# Patient Record
Sex: FEMALE | Race: WHITE | Employment: OTHER | URBAN - METROPOLITAN AREA
[De-identification: names, ages, dates, MRNs, and addresses within clinical notes are randomized per-mention and may not be internally consistent; named-entity substitution may affect disease eponyms.]

---
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
## 2017-03-21 PROBLEM — S52.502A CLOSED FRACTURE OF DISTAL END OF LEFT RADIUS: Status: ACTIVE | Noted: 2017-03-21

## 2017-03-21 PROBLEM — S06.31AA: Status: ACTIVE | Noted: 2017-03-21

## 2018-09-24 ENCOUNTER — TELEPHONE (OUTPATIENT)
Dept: CARDIOLOGY CLINIC | Age: 73
End: 2018-09-24

## 2018-09-25 ENCOUNTER — OFFICE VISIT (OUTPATIENT)
Dept: CARDIOLOGY CLINIC | Age: 73
End: 2018-09-25
Payer: MEDICARE

## 2018-09-25 VITALS
DIASTOLIC BLOOD PRESSURE: 62 MMHG | RESPIRATION RATE: 16 BRPM | HEIGHT: 61 IN | SYSTOLIC BLOOD PRESSURE: 104 MMHG | BODY MASS INDEX: 34.08 KG/M2 | HEART RATE: 63 BPM | WEIGHT: 180.5 LBS

## 2018-09-25 DIAGNOSIS — Z01.810 PREOPERATIVE CARDIOVASCULAR EXAMINATION: ICD-10-CM

## 2018-09-25 DIAGNOSIS — I44.7 LEFT BUNDLE BRANCH BLOCK: ICD-10-CM

## 2018-09-25 DIAGNOSIS — I42.0 DILATED CARDIOMYOPATHY (HCC): ICD-10-CM

## 2018-09-25 DIAGNOSIS — I50.42 CHRONIC COMBINED SYSTOLIC AND DIASTOLIC CONGESTIVE HEART FAILURE (HCC): Primary | ICD-10-CM

## 2018-09-25 PROCEDURE — G8400 PT W/DXA NO RESULTS DOC: HCPCS | Performed by: INTERNAL MEDICINE

## 2018-09-25 PROCEDURE — 93000 ELECTROCARDIOGRAM COMPLETE: CPT | Performed by: INTERNAL MEDICINE

## 2018-09-25 PROCEDURE — 4040F PNEUMOC VAC/ADMIN/RCVD: CPT | Performed by: INTERNAL MEDICINE

## 2018-09-25 PROCEDURE — 1090F PRES/ABSN URINE INCON ASSESS: CPT | Performed by: INTERNAL MEDICINE

## 2018-09-25 PROCEDURE — G8427 DOCREV CUR MEDS BY ELIG CLIN: HCPCS | Performed by: INTERNAL MEDICINE

## 2018-09-25 PROCEDURE — 1036F TOBACCO NON-USER: CPT | Performed by: INTERNAL MEDICINE

## 2018-09-25 PROCEDURE — G8417 CALC BMI ABV UP PARAM F/U: HCPCS | Performed by: INTERNAL MEDICINE

## 2018-09-25 PROCEDURE — 1101F PT FALLS ASSESS-DOCD LE1/YR: CPT | Performed by: INTERNAL MEDICINE

## 2018-09-25 PROCEDURE — 1123F ACP DISCUSS/DSCN MKR DOCD: CPT | Performed by: INTERNAL MEDICINE

## 2018-09-25 PROCEDURE — 3017F COLORECTAL CA SCREEN DOC REV: CPT | Performed by: INTERNAL MEDICINE

## 2018-09-25 PROCEDURE — 99214 OFFICE O/P EST MOD 30 MIN: CPT | Performed by: INTERNAL MEDICINE

## 2018-09-25 NOTE — PROGRESS NOTES
[place] [facility]:  Maia Grey M.D., Fredrick RubiNovant Health Clemmons Medical Centersigifredo. Damari Rose M.D., Sunny Duff M.D. Celeste November Fleet   1945  Angelique Segura MD    Rigoberto Blanco was seen in our [facility] office today, 09/25/2018, for follow up of her dilated nonischemic cardiomyopathy and for preoperative assessment prior to right knee replacement. I saw her last in 02/2018. She subsequently had a left knee replacement without complications. She is scheduled now to have the right knee done. She does admit to some left foot and ankle swelling, but denies any swelling on the right. She does have a history of varicose veins, which were stripped in the past.  She does have mild chronic leg edema with dependent edema and possible lymphedema since then. She denies any chest pain or palpitations. She denies any orthopnea or PND. She denies any recent dramatic weight change. She did note that her creatinine was slightly increased lately and because of this, her Losartan was decreased to 50 mg daily and her torsemide was decreased to every other day. She seems to have tolerated the change well. Repeat laboratory studies are pending. Medical history includes:   1. Chronic LBBB present at least since 2003. 2. Abnormal MPS, 01/23/2003. EF 54%. Distal anteroseptal and apical ischemia. 3. Patient reportedly had catheterization at [facility] in 2003. Records are pending, but the patient was told that the catheterization was normal. 4. Echo, 11/04/2004. EF 45%. Mild PHTN. 5. Echo, 07/17/2006. EF 35-40%. Normal LV dimensions. Global hypokinesis with Stage I diastolic dysfunction. Moderate PHTN with a RVSP of 60 mmHg. 6. Varicose veins. Vein stripping approximately 1985.   7. Cigarette abuse, abstinent since 12/05/2006 on Chantix. 8. Chronic peripheral edema from varicose veins. 9. Hysterectomy. 10. Echo, 10/17/2006. LV size upper limits of normal. EF 40-45%.  Mild global hypokinesis, Stage II diastolic dysfunction, mild MR, mild TR RVSP upper limits of normal.   11. Episode of gout in the fall of 2006. 12. Diagnosis of obstructive sleep apnea, early 2007, treated with CPAP. 13. Echo, 05/07/2007. Normal LV size with mild global hypokinesis. EF 45-50%. Stage II diastolic dysfunction. Moderate LAE. Trace MR, mild TR. Mild PHTN. 14. Fluid retention and SOB noted 03/2007 with improvement after addition of oral Lasix. Electrolytes, BUN, Cr normal 04/25/2007 on 20 mg daily Lasix. 15. Allergy to penicillin. 12. Family history noncontributory. 17. OP CBC, 04/14/2008 with Hb 7. She received a transfusion. 18. Admission [facility], 04/17/2008 with acute appendicitis, WBC 15,000. 19. Laparoscopic appendectomy. Perforation and evidence of peritonitis. She required volume resuscitation and transient discontinuation of diuretics and Avapro. Cr mark to a peak of 2.9 but returned to normal.   20. Postop PE noted on CT angiogram, 04/2008 treated with heparin and then Coumadin. 21. Echo, 06/02/2008. Mild LV dilation. Normal systolic function. EF >55%. Normal diastolic function. Normal tissue Doppler and pulmonary venous flow patterns. Mild LAE. Mild MR, mild TR, mild PHTN.  22. Hospitalization, 08/2008 with atypical chest discomfort. No evidence for acute MI, stress test unremarkable. 23. Episode of syncope after laughing, 04/17/2009. 24. Echo, 04/28/2009 with mild LVE, diastolic dimension 6.3 cm. Borderline global hypokinesis, EF 46%. Diastolic function normal. Mild LAE. MAC with mild MR. Aortic sclerosis without stenosis. RVSP 39 mmHg. 25. 24 hour Holter monitor, 04/20/2009. Normal rate variation, LBBB. Occasional PAC's and PVC's, but no VT or pauses. 26. Echo, 07/08/2013. Mildly dilated LV with normal wall thickness, regional wall motion and systolic function. ~ EF >65%. Stage II diastolic dysfunction. Moderate ISRA. Mild MR and TR.  27. Severe anemia with Hgb 7.5 on 07/10/2013.  History of severe anemia with hemoglobin 7.5, 07/10/2013. Hemoglobin on 10/16/2017 was 11.4.    28. Echocardiogram, 10/16/2017, moderately dilated left ventricle with ejection fraction 55%. No regional wall motion abnormalities. Stage 1 diastolic relaxation abnormalities, trace aortic insufficiency without stenosis. Otherwise, unremarkable.      Review of Systems:  HEENT: negative for acute visual and auditory problems  Constitutional: negative for fever and chills  Respiratory: negative for cough and hemoptysis  Cardiovascular: Patient denies chest pain, palpitations, lightheadedness, syncope or any change in her chronic pedal edema. Gastrointestinal: patient does have an occasional ache in her right lower quadrant  Genitourinary: negative for dysuria and hematuria  Derm: negative for rash and skin lesion(s)  Neurological: negative for seizures and tremors  Endocrine: negative for diabetic symptoms including polydipsia and polyuria  Musculoskeletal: negative for CTD  Psychiatric: negative for anxiety and major depression.     The remainder of the review of systems is negative except as noted above.      On exam, she is an elderly white female who is awake, alert and oriented. P: 63 and regular. BP: 104/62. Wt. 180 lbs, which is 3 lbs. More than she weighed 7 months ago. BMI: 34.1. HEENT  Is normocephalic and atraumatic. Extraocular muscles are intact. Sclerae are clear. Pupils are equal, round and react to light. The oral mucosa is moist.  Tongue is midline. Her neck is supple. She has no jugular distention or hepatojugular reflux. Carotids are full without bruits. She has no neck or supraclavicular masses and no thyromegaly. Respirations are unlabored. Her chest is clear to auscultation and percussion. She has no wheezes or rales and no chest wall tenderness or presacral edema. Her heart has a regular rhythm with an S4 gallop, but no S3 or murmur.   The PMI is not displaced and there is no precordial heave, lift or

## 2018-09-25 NOTE — PATIENT INSTRUCTIONS
Take the spironolactone and torsemide on your usual schedule up till and including the day prior to surgery. They can be resumed the day after surgery has indicated. Take the carvedilol and losartan (Cozaar) up till and including the morning of surgery with a small sip of water. The rest of your medications can be withheld on the day of surgery until after surgery from my perspective.

## 2018-10-30 ENCOUNTER — HOSPITAL ENCOUNTER (OUTPATIENT)
Age: 73
Discharge: HOME | End: 2018-11-01

## 2018-10-30 LAB
ABO/RH: NORMAL
ANTIBODY SCREEN: NORMAL
BILIRUBIN URINE: NEGATIVE
BLOOD, URINE: NEGATIVE
CLARITY: CLEAR
COLOR: YELLOW
GLUCOSE URINE: NEGATIVE MG/DL
KETONES, URINE: NEGATIVE MG/DL
LEUKOCYTE ESTERASE, URINE: NEGATIVE
NITRITE, URINE: NEGATIVE
PH UA: 6 (ref 5–9)
PROTEIN UA: NEGATIVE MG/DL
SPECIFIC GRAVITY UA: 1.01 (ref 1–1.03)
UROBILINOGEN, URINE: 0.2 E.U./DL

## 2018-10-30 PROCEDURE — 86901 BLOOD TYPING SEROLOGIC RH(D): CPT

## 2018-10-30 PROCEDURE — 86900 BLOOD TYPING SEROLOGIC ABO: CPT

## 2018-10-30 PROCEDURE — 87081 CULTURE SCREEN ONLY: CPT

## 2018-10-30 PROCEDURE — 81003 URINALYSIS AUTO W/O SCOPE: CPT

## 2018-10-30 PROCEDURE — 87088 URINE BACTERIA CULTURE: CPT

## 2018-10-30 PROCEDURE — 86850 RBC ANTIBODY SCREEN: CPT

## 2018-11-01 LAB
MRSA CULTURE ONLY: NORMAL
URINE CULTURE, ROUTINE: NORMAL

## 2018-11-09 ENCOUNTER — HOSPITAL ENCOUNTER (OUTPATIENT)
Age: 73
Discharge: HOME | End: 2018-11-11

## 2018-11-09 LAB
ANION GAP SERPL CALCULATED.3IONS-SCNC: 12 MMOL/L (ref 7–16)
BUN BLDV-MCNC: 24 MG/DL (ref 8–23)
CALCIUM SERPL-MCNC: 7.8 MG/DL (ref 8.6–10.2)
CHLORIDE BLD-SCNC: 104 MMOL/L (ref 98–107)
CO2: 20 MMOL/L (ref 22–29)
CREAT SERPL-MCNC: 1.3 MG/DL (ref 0.5–1)
GFR AFRICAN AMERICAN: 49
GFR NON-AFRICAN AMERICAN: 40 ML/MIN/1.73
GLUCOSE BLD-MCNC: 211 MG/DL (ref 74–99)
HCT VFR BLD CALC: 29.2 % (ref 34–48)
HEMOGLOBIN: 9 G/DL (ref 11.5–15.5)
MCH RBC QN AUTO: 32 PG (ref 26–35)
MCHC RBC AUTO-ENTMCNC: 30.8 % (ref 32–34.5)
MCV RBC AUTO: 103.9 FL (ref 80–99.9)
PDW BLD-RTO: 14.2 FL (ref 11.5–15)
PLATELET # BLD: 198 E9/L (ref 130–450)
PMV BLD AUTO: 10 FL (ref 7–12)
POTASSIUM SERPL-SCNC: 4.8 MMOL/L (ref 3.5–5)
RBC # BLD: 2.81 E12/L (ref 3.5–5.5)
SODIUM BLD-SCNC: 136 MMOL/L (ref 132–146)
WBC # BLD: 8.8 E9/L (ref 4.5–11.5)

## 2018-11-09 PROCEDURE — 85027 COMPLETE CBC AUTOMATED: CPT

## 2018-11-09 PROCEDURE — 80048 BASIC METABOLIC PNL TOTAL CA: CPT

## 2018-11-10 ENCOUNTER — HOSPITAL ENCOUNTER (OUTPATIENT)
Age: 73
Discharge: HOME | End: 2018-11-12

## 2018-11-10 LAB
ANION GAP SERPL CALCULATED.3IONS-SCNC: 12 MMOL/L (ref 7–16)
BUN BLDV-MCNC: 29 MG/DL (ref 8–23)
CALCIUM SERPL-MCNC: 8.3 MG/DL (ref 8.6–10.2)
CHLORIDE BLD-SCNC: 105 MMOL/L (ref 98–107)
CO2: 23 MMOL/L (ref 22–29)
CREAT SERPL-MCNC: 1.5 MG/DL (ref 0.5–1)
GFR AFRICAN AMERICAN: 41
GFR NON-AFRICAN AMERICAN: 34 ML/MIN/1.73
GLUCOSE BLD-MCNC: 92 MG/DL (ref 74–99)
HCT VFR BLD CALC: 29.3 % (ref 34–48)
HEMOGLOBIN: 8.9 G/DL (ref 11.5–15.5)
MCH RBC QN AUTO: 31.8 PG (ref 26–35)
MCHC RBC AUTO-ENTMCNC: 30.4 % (ref 32–34.5)
MCV RBC AUTO: 104.6 FL (ref 80–99.9)
PDW BLD-RTO: 14.6 FL (ref 11.5–15)
PLATELET # BLD: 200 E9/L (ref 130–450)
PMV BLD AUTO: 10.1 FL (ref 7–12)
POTASSIUM SERPL-SCNC: 4.4 MMOL/L (ref 3.5–5)
RBC # BLD: 2.8 E12/L (ref 3.5–5.5)
SODIUM BLD-SCNC: 140 MMOL/L (ref 132–146)
WBC # BLD: 8.3 E9/L (ref 4.5–11.5)

## 2018-11-10 PROCEDURE — 85027 COMPLETE CBC AUTOMATED: CPT

## 2018-11-10 PROCEDURE — 80048 BASIC METABOLIC PNL TOTAL CA: CPT

## 2019-03-06 RX ORDER — TORSEMIDE 20 MG/1
TABLET ORAL
Qty: 45 TABLET | Refills: 3 | Status: SHIPPED | OUTPATIENT
Start: 2019-03-06

## 2019-04-23 ENCOUNTER — OFFICE VISIT (OUTPATIENT)
Dept: CARDIOLOGY CLINIC | Age: 74
End: 2019-04-23
Payer: MEDICARE

## 2019-04-23 VITALS — WEIGHT: 182.6 LBS | RESPIRATION RATE: 18 BRPM | HEIGHT: 61 IN | BODY MASS INDEX: 34.48 KG/M2

## 2019-04-23 DIAGNOSIS — I42.0 DILATED CARDIOMYOPATHY (HCC): ICD-10-CM

## 2019-04-23 DIAGNOSIS — I50.42 CHRONIC COMBINED SYSTOLIC AND DIASTOLIC CONGESTIVE HEART FAILURE (HCC): Primary | ICD-10-CM

## 2019-04-23 DIAGNOSIS — R06.09 DYSPNEA ON EXERTION: ICD-10-CM

## 2019-04-23 DIAGNOSIS — I50.22 CHRONIC SYSTOLIC CONGESTIVE HEART FAILURE (HCC): ICD-10-CM

## 2019-04-23 DIAGNOSIS — I44.7 LEFT BUNDLE BRANCH BLOCK: ICD-10-CM

## 2019-04-23 PROCEDURE — 93000 ELECTROCARDIOGRAM COMPLETE: CPT | Performed by: INTERNAL MEDICINE

## 2019-04-23 PROCEDURE — 3017F COLORECTAL CA SCREEN DOC REV: CPT | Performed by: INTERNAL MEDICINE

## 2019-04-23 PROCEDURE — 4040F PNEUMOC VAC/ADMIN/RCVD: CPT | Performed by: INTERNAL MEDICINE

## 2019-04-23 PROCEDURE — 1123F ACP DISCUSS/DSCN MKR DOCD: CPT | Performed by: INTERNAL MEDICINE

## 2019-04-23 PROCEDURE — 1090F PRES/ABSN URINE INCON ASSESS: CPT | Performed by: INTERNAL MEDICINE

## 2019-04-23 PROCEDURE — G8400 PT W/DXA NO RESULTS DOC: HCPCS | Performed by: INTERNAL MEDICINE

## 2019-04-23 PROCEDURE — 99213 OFFICE O/P EST LOW 20 MIN: CPT | Performed by: INTERNAL MEDICINE

## 2019-04-23 PROCEDURE — 1036F TOBACCO NON-USER: CPT | Performed by: INTERNAL MEDICINE

## 2019-04-23 PROCEDURE — G8417 CALC BMI ABV UP PARAM F/U: HCPCS | Performed by: INTERNAL MEDICINE

## 2019-04-23 PROCEDURE — G8427 DOCREV CUR MEDS BY ELIG CLIN: HCPCS | Performed by: INTERNAL MEDICINE

## 2019-04-23 NOTE — PROGRESS NOTES
Spouse name: Not on file    Number of children: Not on file    Years of education: Not on file    Highest education level: Not on file   Occupational History    Not on file   Social Needs    Financial resource strain: Not on file    Food insecurity:     Worry: Not on file     Inability: Not on file    Transportation needs:     Medical: Not on file     Non-medical: Not on file   Tobacco Use    Smoking status: Former Smoker     Packs/day: 1.00     Years: 40.00     Pack years: 40.00     Types: Cigarettes     Last attempt to quit: 12/1/2008     Years since quitting: 10.3    Smokeless tobacco: Never Used    Tobacco comment: quit 06   Substance and Sexual Activity    Alcohol use: Yes     Alcohol/week: 1.2 oz     Types: 2 Glasses of wine per week     Comment: occassional    Drug use: No    Sexual activity: Not on file   Lifestyle    Physical activity:     Days per week: Not on file     Minutes per session: Not on file    Stress: Not on file   Relationships    Social connections:     Talks on phone: Not on file     Gets together: Not on file     Attends [employer]oration[employer] service: Not on file     Active member of club or organization: Not on file     Attends meetings of clubs or organizations: Not on file     Relationship status: Not on file    Intimate partner violence:     Fear of current or ex partner: Not on file     Emotionally abused: Not on file     Physically abused: Not on file     Forced sexual activity: Not on file   Other Topics Concern    Not on file   Social History Narrative    Not on file       Allergies:   Allergies   Allergen Reactions    Mefoxin [Cefoxitin] Anaphylaxis    Pcn [Penicillins] Hives    Sulfa Antibiotics Rash       Current Medications:  Current Outpatient Medications   Medication Sig Dispense Refill    torsemide (DEMADEX) 20 MG tablet every other day now takes 20 mg 45 tablet 3    spironolactone (ALDACTONE) 25 MG tablet TAKE ONE TABLET BY MOUTH EVERY DAY 90 tablet 3    atorvastatin (LIPITOR) 20 MG tablet Take 20 mg by mouth daily      losartan (COZAAR) 100 MG tablet TAKE ONE TABLET BY MOUTH EVERY DAY (Patient taking differently: pt taking 50 mg daily now) 60 tablet 5    carvedilol (COREG) 12.5 MG tablet TAKE ONE TABLET BY MOUTH TWO TIMES A  tablet 3    acetaminophen (TYLENOL) 500 MG tablet Take 1,000 mg by mouth every 6 hours as needed for Pain      ferrous sulfate 325 (65 FE) MG tablet Take 325 mg by mouth every evening       allopurinol (ZYLOPRIM) 300 MG tablet Take 300 mg by mouth daily.  buPROPion (WELLBUTRIN XL) 300 MG XL tablet Take 300 mg by mouth every morning.  fluconazole (DIFLUCAN) 100 MG tablet TAKE 1 TABLET BY MOUTH 1 TIME PER DAY  0    Cholecalciferol (VITAMIN D3) 2000 UNITS CAPS Take 1 capsule by mouth every morning       No current facility-administered medications for this visit.         Physical Exam:  Resp 18   Ht 5' 1\" (1.549 m)   Wt 182 lb 9.6 oz (82.8 kg)   BMI 34.50 kg/m²   Wt Readings from Last 3 Encounters:   04/23/19 182 lb 9.6 oz (82.8 kg)   09/25/18 180 lb 8 oz (81.9 kg)   02/21/18 177 lb (80.3 kg)     Appearance: Awake, alert, no acute respiratory distress  Skin: Intact, no rash  Head: Normocephalic, atraumatic  Eyes: EOMI, no conjunctival erythema  ENMT: No pharyngeal erythema, MMM, no rhinorrhea  Neck: Supple, no carotid bruits  Lungs: Decreased BS B/L, no wheezing  Cardiac: Regular rate and rhythm, +S1S2, no murmurs apparent  Abdomen: Soft, nontender, +bowel sounds  Extremities: Moves all extremities x 4, 1+ lower extremity edema  Neurologic: No focal motor deficits apparent, normal mood and affect, alert and oriented x 3    Laboratory Tests:  Lab Results   Component Value Date    CREATININE 1.5 (H) 11/10/2018    BUN 29 (H) 11/10/2018     11/10/2018    K 4.4 11/10/2018     11/10/2018    CO2 23 11/10/2018     No results found for: MG  Lab Results   Component Value Date    WBC 8.3 11/10/2018    HGB 8.9 (L) 11/10/2018    HCT 29.3 (L) 11/10/2018    .6 (H) 11/10/2018     11/10/2018     Lab Results   Component Value Date    ALT 12 02/14/2018    AST 20 02/14/2018    ALKPHOS 73 02/14/2018    BILITOT 0.6 02/14/2018     Lab Results   Component Value Date    CHOL 173 02/14/2018    CHOL 276 (H) 02/17/2011     Lab Results   Component Value Date    TRIG 55 02/14/2018    TRIG 60 02/17/2011     Lab Results   Component Value Date     02/14/2018    .6 02/17/2011     Lab Results   Component Value Date    LDLCALC 60 02/14/2018    LDLCALC 163 (H) 02/17/2011     Lab Results   Component Value Date    LABVLDL 11 02/14/2018     No results found for: CHOLHDLRATIO  No results for input(s): PROBNP in the last 72 hours. Cardiac Tests:  ECG: SR, rate 61, LBBB    ASSESSMENT / PLAN:  1. Chronic LBBB present at least since 2003. 2. Abnormal MPS, 01/23/2003. EF 54%. Distal anteroseptal and apical ischemia. 3. Patient reportedly had catheterization at [facility] in 2003. Records are pending, but the patient was told that the catheterization was normal. 4. Echo, 11/04/2004. EF 45%. Mild PHTN. 5. Echo, 07/17/2006. EF 35-40%. Normal LV dimensions. Global hypokinesis with Stage I diastolic dysfunction. Moderate PHTN with a RVSP of 60 mmHg. 6. Cigarette abuse, abstinent since 12/2006  7. Chronic peripheral edema from varicose veins. 8. Echo, 10/17/2006. LV size upper limits of normal. EF 40-45%. Mild global hypokinesis, Stage II diastolic dysfunction, mild MR, mild TR RVSP upper limits of normal.   9. Diagnosis of obstructive sleep apnea, early 2007, treated with CPAP. 10. Echo, 05/07/2007. Normal LV size with mild global hypokinesis. EF 45-50%. Stage II diastolic dysfunction. Moderate LAE. Trace MR, mild TR. Mild PHTN.   11. OP CBC, 04/14/2008 with Hb 7. She received a transfusion. 12. Postop PE noted on CT angiogram, 04/2008 treated with heparin and then Coumadin. 13. Echo, 06/02/2008. Mild LV dilation.  Normal systolic function. EF >55%. Normal diastolic function. Normal tissue Doppler and pulmonary venous flow patterns. Mild LAE. Mild MR, mild TR, mild PHTN. 14. Hospitalization, 08/2008 with atypical chest discomfort. No evidence for acute MI, stress test unremarkable. 15. Episode of syncope after laughing, 04/17/2009. 16. Echo, 04/28/2009 with mild LVE, diastolic dimension 6.3 cm. Borderline global hypokinesis, EF 46%. Diastolic function normal. Mild LAE. MAC with mild MR. Aortic sclerosis without stenosis. RVSP 39 mmHg. 17. 24 hour Holter monitor, 04/20/2009. Normal rate variation, LBBB. Occasional PAC's and PVC's, but no VT or pauses. 18. Echo, 07/08/2013. Mildly dilated LV with normal wall thickness, regional wall motion and systolic function. ~ EF >65%. Stage II diastolic dysfunction. Moderate ISRA. Mild MR and TR.  19. Severe anemia with Hgb 7.5 on 07/10/2013. History of severe anemia with hemoglobin 7.5, 07/10/2013. Hemoglobin on 10/16/2017 was 11.4.    20. Echocardiogram, 10/16/2017, moderately dilated left ventricle with ejection fraction 55%.  No regional wall motion abnormalities.  Stage 1 diastolic relaxation abnormalities, trace aortic insufficiency without stenosis.  Otherwise, unremarkable. - Prior cardiac studies reviewed today  - Monitor renal function and electrolytes closely on diuretics and ARB  - Continue coreg      The patient's current medication list, allergies, problem list and results of all previously ordered testing were reviewed at today's visit.     Jai Arreaga MD  [facility]) Cardiology

## 2019-05-03 DIAGNOSIS — M81.0 SENILE OSTEOPOROSIS: ICD-10-CM

## 2019-05-03 RX ORDER — SODIUM CHLORIDE 9 MG/ML
INJECTION, SOLUTION INTRAVENOUS CONTINUOUS
Status: CANCELLED | OUTPATIENT
Start: 2019-05-03

## 2019-05-03 RX ORDER — METHYLPREDNISOLONE SODIUM SUCCINATE 125 MG/2ML
125 INJECTION, POWDER, LYOPHILIZED, FOR SOLUTION INTRAMUSCULAR; INTRAVENOUS ONCE
Status: CANCELLED | OUTPATIENT
Start: 2019-05-03

## 2019-05-03 RX ORDER — EPINEPHRINE 1 MG/ML
0.3 INJECTION, SOLUTION, CONCENTRATE INTRAVENOUS PRN
Status: CANCELLED | OUTPATIENT
Start: 2019-05-03

## 2019-05-03 RX ORDER — DIPHENHYDRAMINE HYDROCHLORIDE 50 MG/ML
50 INJECTION INTRAMUSCULAR; INTRAVENOUS ONCE
Status: CANCELLED | OUTPATIENT
Start: 2019-05-03

## 2019-05-03 RX ORDER — 0.9 % SODIUM CHLORIDE 0.9 %
10 VIAL (ML) INJECTION ONCE
Status: CANCELLED | OUTPATIENT
Start: 2019-05-03

## 2019-05-15 ENCOUNTER — HOSPITAL ENCOUNTER (OUTPATIENT)
Dept: INFUSION THERAPY | Age: 74
Setting detail: INFUSION SERIES
Discharge: HOME | End: 2019-05-15
Payer: MEDICARE

## 2019-05-15 VITALS
OXYGEN SATURATION: 97 % | BODY MASS INDEX: 34.36 KG/M2 | HEART RATE: 66 BPM | RESPIRATION RATE: 16 BRPM | TEMPERATURE: 98.7 F | HEIGHT: 61 IN | WEIGHT: 182 LBS | SYSTOLIC BLOOD PRESSURE: 112 MMHG | DIASTOLIC BLOOD PRESSURE: 64 MMHG

## 2019-05-15 DIAGNOSIS — M81.0 SENILE OSTEOPOROSIS: Primary | ICD-10-CM

## 2019-05-15 PROCEDURE — 6360000002 HC RX W HCPCS: Performed by: OBSTETRICS & GYNECOLOGY

## 2019-05-15 PROCEDURE — 96372 THER/PROPH/DIAG INJ SC/IM: CPT

## 2019-05-15 RX ORDER — SODIUM CHLORIDE 9 MG/ML
INJECTION, SOLUTION INTRAVENOUS CONTINUOUS
Status: CANCELLED | OUTPATIENT
Start: 2019-11-13

## 2019-05-15 RX ORDER — EPINEPHRINE 1 MG/ML
0.3 INJECTION, SOLUTION, CONCENTRATE INTRAVENOUS PRN
Status: CANCELLED | OUTPATIENT
Start: 2019-11-13

## 2019-05-15 RX ORDER — METHYLPREDNISOLONE SODIUM SUCCINATE 125 MG/2ML
125 INJECTION, POWDER, LYOPHILIZED, FOR SOLUTION INTRAMUSCULAR; INTRAVENOUS ONCE
Status: CANCELLED | OUTPATIENT
Start: 2019-11-13

## 2019-05-15 RX ORDER — 0.9 % SODIUM CHLORIDE 0.9 %
10 VIAL (ML) INJECTION ONCE
Status: CANCELLED | OUTPATIENT
Start: 2019-11-13

## 2019-05-15 RX ORDER — DIPHENHYDRAMINE HYDROCHLORIDE 50 MG/ML
50 INJECTION INTRAMUSCULAR; INTRAVENOUS ONCE
Status: CANCELLED | OUTPATIENT
Start: 2019-11-13

## 2019-05-15 RX ADMIN — DENOSUMAB 60 MG: 60 INJECTION SUBCUTANEOUS at 10:38

## 2019-05-15 NOTE — PROGRESS NOTES
Patient tolerated PROLIA  injection well. Therapy plan reviewed with patient. Verbalizes understanding. Reviewed AVS with patient, reviewed medication information, verbalizes good knowledge of current plan, and has no signs or symptoms to report at this time. Declines copy of AVS.  Next appointment made and patient instructed on lab draw and procedure for next injection.

## 2019-10-24 ENCOUNTER — HOSPITAL ENCOUNTER (EMERGENCY)
Age: 74
Discharge: HOME | End: 2019-10-24
Payer: MEDICARE

## 2019-10-24 VITALS
OXYGEN SATURATION: 94 % | WEIGHT: 180 LBS | HEIGHT: 61 IN | BODY MASS INDEX: 33.99 KG/M2 | DIASTOLIC BLOOD PRESSURE: 65 MMHG | SYSTOLIC BLOOD PRESSURE: 133 MMHG | TEMPERATURE: 98.2 F | RESPIRATION RATE: 14 BRPM | HEART RATE: 72 BPM

## 2019-10-24 DIAGNOSIS — S01.81XA FACIAL LACERATION, INITIAL ENCOUNTER: Primary | ICD-10-CM

## 2019-10-24 PROCEDURE — 90471 IMMUNIZATION ADMIN: CPT | Performed by: PHYSICIAN ASSISTANT

## 2019-10-24 PROCEDURE — 90715 TDAP VACCINE 7 YRS/> IM: CPT | Performed by: PHYSICIAN ASSISTANT

## 2019-10-24 PROCEDURE — 99282 EMERGENCY DEPT VISIT SF MDM: CPT

## 2019-10-24 PROCEDURE — 6360000002 HC RX W HCPCS: Performed by: PHYSICIAN ASSISTANT

## 2019-10-24 PROCEDURE — 12011 RPR F/E/E/N/L/M 2.5 CM/<: CPT

## 2019-10-24 RX ORDER — BACITRACIN ZINC AND POLYMYXIN B SULFATE 500; 1000 [USP'U]/G; [USP'U]/G
OINTMENT TOPICAL
Qty: 30 G | Refills: 0 | Status: SHIPPED | OUTPATIENT
Start: 2019-10-24 | End: 2019-10-31

## 2019-10-24 RX ADMIN — TETANUS TOXOID, REDUCED DIPHTHERIA TOXOID AND ACELLULAR PERTUSSIS VACCINE, ADSORBED 0.5 ML: 5; 2.5; 8; 8; 2.5 SUSPENSION INTRAMUSCULAR at 04:41

## 2019-10-24 ASSESSMENT — PAIN DESCRIPTION - PAIN TYPE: TYPE: ACUTE PAIN

## 2019-10-24 ASSESSMENT — PAIN SCALES - GENERAL: PAINLEVEL_OUTOF10: 3

## 2019-10-30 ENCOUNTER — OFFICE VISIT (OUTPATIENT)
Dept: SURGERY | Age: 74
End: 2019-10-30
Payer: MEDICARE

## 2019-10-30 VITALS
WEIGHT: 185 LBS | DIASTOLIC BLOOD PRESSURE: 80 MMHG | TEMPERATURE: 97.7 F | OXYGEN SATURATION: 97 % | RESPIRATION RATE: 20 BRPM | HEIGHT: 61 IN | SYSTOLIC BLOOD PRESSURE: 110 MMHG | HEART RATE: 66 BPM | BODY MASS INDEX: 34.93 KG/M2
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
## 2019-10-30 PROCEDURE — G8484 FLU IMMUNIZE NO ADMIN: HCPCS | Performed by: PHYSICIAN ASSISTANT

## 2019-10-30 PROCEDURE — 4040F PNEUMOC VAC/ADMIN/RCVD: CPT | Performed by: PHYSICIAN ASSISTANT

## 2019-10-30 PROCEDURE — 1090F PRES/ABSN URINE INCON ASSESS: CPT | Performed by: PHYSICIAN ASSISTANT

## 2019-10-30 PROCEDURE — 1036F TOBACCO NON-USER: CPT | Performed by: PHYSICIAN ASSISTANT

## 2019-10-30 PROCEDURE — G8427 DOCREV CUR MEDS BY ELIG CLIN: HCPCS | Performed by: PHYSICIAN ASSISTANT

## 2019-10-30 PROCEDURE — 99213 OFFICE O/P EST LOW 20 MIN: CPT | Performed by: PHYSICIAN ASSISTANT

## 2019-10-30 PROCEDURE — 3017F COLORECTAL CA SCREEN DOC REV: CPT | Performed by: PHYSICIAN ASSISTANT

## 2019-10-30 PROCEDURE — 1123F ACP DISCUSS/DSCN MKR DOCD: CPT | Performed by: PHYSICIAN ASSISTANT

## 2019-10-30 PROCEDURE — G8417 CALC BMI ABV UP PARAM F/U: HCPCS | Performed by: PHYSICIAN ASSISTANT

## 2019-10-30 PROCEDURE — G8400 PT W/DXA NO RESULTS DOC: HCPCS | Performed by: PHYSICIAN ASSISTANT

## 2019-11-13 ENCOUNTER — HOSPITAL ENCOUNTER (OUTPATIENT)
Dept: INFUSION THERAPY | Age: 74
Setting detail: INFUSION SERIES
Discharge: HOME | End: 2019-11-13
Payer: MEDICARE

## 2019-11-13 VITALS
HEART RATE: 76 BPM | SYSTOLIC BLOOD PRESSURE: 107 MMHG | WEIGHT: 185 LBS | HEIGHT: 61 IN | DIASTOLIC BLOOD PRESSURE: 58 MMHG | OXYGEN SATURATION: 97 % | TEMPERATURE: 97.6 F | BODY MASS INDEX: 34.93 KG/M2 | RESPIRATION RATE: 16 BRPM

## 2019-11-13 DIAGNOSIS — M81.0 SENILE OSTEOPOROSIS: Primary | ICD-10-CM

## 2019-11-13 PROCEDURE — 96372 THER/PROPH/DIAG INJ SC/IM: CPT

## 2019-11-13 PROCEDURE — 6360000002 HC RX W HCPCS: Performed by: OBSTETRICS & GYNECOLOGY

## 2019-11-13 RX ORDER — DIPHENHYDRAMINE HYDROCHLORIDE 50 MG/ML
50 INJECTION INTRAMUSCULAR; INTRAVENOUS ONCE
Status: CANCELLED | OUTPATIENT
Start: 2020-05-13

## 2019-11-13 RX ORDER — SODIUM CHLORIDE 9 MG/ML
INJECTION, SOLUTION INTRAVENOUS CONTINUOUS
Status: CANCELLED | OUTPATIENT
Start: 2020-05-13

## 2019-11-13 RX ORDER — 0.9 % SODIUM CHLORIDE 0.9 %
10 VIAL (ML) INJECTION ONCE
Status: CANCELLED | OUTPATIENT
Start: 2020-05-13

## 2019-11-13 RX ORDER — METHYLPREDNISOLONE SODIUM SUCCINATE 125 MG/2ML
125 INJECTION, POWDER, LYOPHILIZED, FOR SOLUTION INTRAMUSCULAR; INTRAVENOUS ONCE
Status: CANCELLED | OUTPATIENT
Start: 2020-05-13

## 2019-11-13 RX ORDER — EPINEPHRINE 1 MG/ML
0.3 INJECTION, SOLUTION, CONCENTRATE INTRAVENOUS PRN
Status: CANCELLED | OUTPATIENT
Start: 2020-05-13

## 2019-11-13 RX ADMIN — DENOSUMAB 60 MG: 60 INJECTION SUBCUTANEOUS at 10:45

## 2019-11-18 ENCOUNTER — HOSPITAL ENCOUNTER (OUTPATIENT)
Age: 74
Discharge: HOME | End: 2019-11-20
Payer: MEDICARE

## 2019-11-18 PROCEDURE — 87088 URINE BACTERIA CULTURE: CPT

## 2019-11-20 LAB — URINE CULTURE, ROUTINE: NORMAL

## 2020-01-09 RX ORDER — SPIRONOLACTONE 25 MG/1
TABLET ORAL
Qty: 90 TABLET | Refills: 3 | Status: SHIPPED | OUTPATIENT
Start: 2020-01-09

## 2020-02-17 ENCOUNTER — HOSPITAL ENCOUNTER (OUTPATIENT)
Age: 75
Discharge: HOME | End: 2020-02-19
Payer: MEDICARE

## 2020-02-17 PROCEDURE — 87077 CULTURE AEROBIC IDENTIFY: CPT

## 2020-02-17 PROCEDURE — 87186 SC STD MICRODIL/AGAR DIL: CPT

## 2020-02-17 PROCEDURE — 87088 URINE BACTERIA CULTURE: CPT

## 2020-02-19 LAB
ORGANISM: ABNORMAL
URINE CULTURE, ROUTINE: ABNORMAL

## 2020-05-13 ENCOUNTER — HOSPITAL ENCOUNTER (OUTPATIENT)
Dept: INFUSION THERAPY | Age: 75
Setting detail: INFUSION SERIES
Discharge: HOME | End: 2020-05-13
Payer: MEDICARE

## 2020-05-13 VITALS
HEART RATE: 69 BPM | WEIGHT: 190 LBS | TEMPERATURE: 97.9 F | SYSTOLIC BLOOD PRESSURE: 117 MMHG | BODY MASS INDEX: 35.87 KG/M2 | RESPIRATION RATE: 16 BRPM | HEIGHT: 61 IN | DIASTOLIC BLOOD PRESSURE: 70 MMHG | OXYGEN SATURATION: 99 %

## 2020-05-13 DIAGNOSIS — M81.0 SENILE OSTEOPOROSIS: Primary | ICD-10-CM

## 2020-05-13 PROCEDURE — 6360000002 HC RX W HCPCS: Performed by: OBSTETRICS & GYNECOLOGY

## 2020-05-13 PROCEDURE — 96372 THER/PROPH/DIAG INJ SC/IM: CPT

## 2020-05-13 RX ADMIN — DENOSUMAB 60 MG: 60 INJECTION SUBCUTANEOUS at 10:21

## 2020-06-22 ENCOUNTER — HOSPITAL ENCOUNTER (OUTPATIENT)
Dept: NON INVASIVE DIAGNOSTICS | Age: 75
Discharge: HOME | End: 2020-06-22
Payer: MEDICARE

## 2020-06-22 LAB
LV EF: 53 %
LVEF MODALITY: NORMAL

## 2020-06-22 PROCEDURE — 93306 TTE W/DOPPLER COMPLETE: CPT

## 2020-11-11 ENCOUNTER — HOSPITAL ENCOUNTER (OUTPATIENT)
Dept: INFUSION THERAPY | Age: 75
Setting detail: INFUSION SERIES
Discharge: HOME | End: 2020-11-11
Payer: MEDICARE

## 2020-11-11 VITALS
TEMPERATURE: 98 F | RESPIRATION RATE: 16 BRPM | OXYGEN SATURATION: 97 % | HEART RATE: 72 BPM | BODY MASS INDEX: 37.76 KG/M2 | SYSTOLIC BLOOD PRESSURE: 106 MMHG | DIASTOLIC BLOOD PRESSURE: 52 MMHG | WEIGHT: 200 LBS | HEIGHT: 61 IN

## 2020-11-11 DIAGNOSIS — M81.0 SENILE OSTEOPOROSIS: Primary | ICD-10-CM

## 2020-11-11 PROCEDURE — 96372 THER/PROPH/DIAG INJ SC/IM: CPT

## 2021-05-12 ENCOUNTER — HOSPITAL ENCOUNTER (OUTPATIENT)
Dept: INFUSION THERAPY | Age: 76
Setting detail: INFUSION SERIES
Discharge: HOME | End: 2021-05-12
Payer: MEDICARE

## 2021-05-12 VITALS
BODY MASS INDEX: 38.71 KG/M2 | TEMPERATURE: 97.8 F | SYSTOLIC BLOOD PRESSURE: 104 MMHG | HEART RATE: 78 BPM | OXYGEN SATURATION: 98 % | WEIGHT: 205 LBS | DIASTOLIC BLOOD PRESSURE: 55 MMHG | HEIGHT: 61 IN | RESPIRATION RATE: 16 BRPM

## 2021-05-12 DIAGNOSIS — M81.0 SENILE OSTEOPOROSIS: Primary | ICD-10-CM

## 2021-05-12 PROCEDURE — 6360000002 HC RX W HCPCS: Performed by: OBSTETRICS & GYNECOLOGY

## 2021-05-12 PROCEDURE — 96372 THER/PROPH/DIAG INJ SC/IM: CPT

## 2021-05-12 RX ADMIN — DENOSUMAB 60 MG: 60 INJECTION SUBCUTANEOUS at 10:12

## 2021-06-03 ENCOUNTER — OFFICE VISIT (OUTPATIENT)
Dept: CARDIOLOGY CLINIC | Age: 76
End: 2021-06-03
Payer: MEDICARE

## 2021-06-03 VITALS
SYSTOLIC BLOOD PRESSURE: 114 MMHG | HEART RATE: 72 BPM | WEIGHT: 206.4 LBS | BODY MASS INDEX: 38.97 KG/M2 | RESPIRATION RATE: 16 BRPM | HEIGHT: 61 IN | DIASTOLIC BLOOD PRESSURE: 62 MMHG

## 2021-06-03 DIAGNOSIS — G47.33 OSA (OBSTRUCTIVE SLEEP APNEA): ICD-10-CM

## 2021-06-03 DIAGNOSIS — I50.22 CHRONIC SYSTOLIC CONGESTIVE HEART FAILURE (HCC): Primary | ICD-10-CM

## 2021-06-03 DIAGNOSIS — I35.1 NONRHEUMATIC AORTIC VALVE INSUFFICIENCY: ICD-10-CM

## 2021-06-03 DIAGNOSIS — I44.7 LEFT BUNDLE BRANCH BLOCK: ICD-10-CM

## 2021-06-03 PROCEDURE — 4040F PNEUMOC VAC/ADMIN/RCVD: CPT | Performed by: INTERNAL MEDICINE

## 2021-06-03 PROCEDURE — 1090F PRES/ABSN URINE INCON ASSESS: CPT | Performed by: INTERNAL MEDICINE

## 2021-06-03 PROCEDURE — 1036F TOBACCO NON-USER: CPT | Performed by: INTERNAL MEDICINE

## 2021-06-03 PROCEDURE — 99214 OFFICE O/P EST MOD 30 MIN: CPT | Performed by: INTERNAL MEDICINE

## 2021-06-03 PROCEDURE — 93000 ELECTROCARDIOGRAM COMPLETE: CPT | Performed by: INTERNAL MEDICINE

## 2021-06-03 PROCEDURE — G8417 CALC BMI ABV UP PARAM F/U: HCPCS | Performed by: INTERNAL MEDICINE

## 2021-06-03 PROCEDURE — 3017F COLORECTAL CA SCREEN DOC REV: CPT | Performed by: INTERNAL MEDICINE

## 2021-06-03 PROCEDURE — G8400 PT W/DXA NO RESULTS DOC: HCPCS | Performed by: INTERNAL MEDICINE

## 2021-06-03 PROCEDURE — 1123F ACP DISCUSS/DSCN MKR DOCD: CPT | Performed by: INTERNAL MEDICINE

## 2021-06-03 PROCEDURE — G8427 DOCREV CUR MEDS BY ELIG CLIN: HCPCS | Performed by: INTERNAL MEDICINE

## 2021-06-03 RX ORDER — LOSARTAN POTASSIUM 25 MG/1
25 TABLET ORAL DAILY
COMMUNITY

## 2021-06-03 NOTE — PROGRESS NOTES
OUTPATIENT CARDIOLOGY FOLLOW-UP    Name: Estrella Cabrera    Age: 76 y.o. Primary Care Physician: Rojas Acosta MD    Date of Service: 6/3/2021    Chief Complaint: Follow-up for NICM, LBBB    Interim History:  Previously followed by Dr. Florence Benitez; she established care with me in 4/2019. No new cardiac complaints since last cardiology evaluation. She denies recent chest pain, palpitations, syncope, PND, or orthopnea. +chronic LÓPEZ (she uses O2 occasionally when she ambulates) / she follows with Dr. Severa Laurence. LE edema stable. SR with LBBB on EKG.     Review of Systems:   Cardiac: As per HPI  General: No fever, chills  Pulmonary: As per HPI  HEENT: No visual disturbances, difficult swallowing  GI: No nausea, vomiting  : No dysuria, hematuria  Endocrine: No thyroid disease or DM  Musculoskeletal: SAHA x 4, no focal motor deficits  Skin: Intact, no rashes  Neuro: No headache, seizures  Psych: Currently with no depression, anxiety    Past Medical History:  Past Medical History:   Diagnosis Date    Anemia     Arthritis     Blood circulation, collateral     legs    Broken arm 03/2017    Bruising tendency (HCC)     [facility]     COPD (chronic obstructive pulmonary disease) (HCC)     Depression     Dilated cardiomyopathy ([facility]     Disease of blood and blood forming organ     anemia    Gout     Head injury     History of blood transfusion     Hyperlipidemia     Hypertension     Knee problem July 4th 2014 injections both knees    LBBB (left bundle branch block)     PE (pulmonary embolism) 4/2008    Post OP    Sleep apnea     UTI (urinary tract infection) 11/2019    Varicose veins        Past Surgical History:  Past Surgical History:   Procedure Laterality Date    APPENDECTOMY      [facility]  2003    [facility]    COLONOSCOPY      HERNIA REPAIR      HYSTERECTOMY         Family History:  Family History   Problem Relation Age of Onset    Cancer Mother breast & me.to the brain    Other Father         aortic aney.  Arthritis Father     Heart Disease Father     Depression Father        Social History:  Social History     Socioeconomic History    Marital status:      Spouse name: Not on file    Number of children: Not on file    Years of education: Not on file    Highest education level: Not on file   Occupational History    Not on file   Tobacco Use    Smoking status: Former Smoker     Packs/day: 1.00     Years: 40.00     Pack years: 40.00     Types: Cigarettes     Quit date: 2008     Years since quittin.5    Smokeless tobacco: Never Used    Tobacco comment: quit 06   Vaping Use    Vaping Use: Never used   Substance and Sexual Activity    Alcohol use: Yes     Alcohol/week: 2.0 standard drinks     Types: 2 Glasses of wine per week     Comment: occassional    Drug use: No    Sexual activity: Not on file   Other Topics Concern    Not on file   Social History Narrative    Not on file     Social Determinants of Health     Financial Resource Strain:     Difficulty of Paying Living Expenses:    Food Insecurity:     Worried About Running Out of Food in the Last Year:     [address] in the Last Year:    Transportation Needs:     Lack of Transportation (Medical):  Lack of Transportation (Non-Medical):    Physical Activity:     Days of Exercise per Week:     Minutes of Exercise per Session:    Stress:     Feeling of Stress :    Social Connections:     Frequency of Communication with Friends and Family:     Frequency of Social Gatherings with Friends and Family:     Attends Rastafari Services:     Active Member of Clubs or Organizations:     Attends Club or Organization Meetings:     Marital Status:    Intimate Partner Violence:     Fear of Current or Ex-Partner:     Emotionally Abused:     Physically Abused:     Sexually Abused: Allergies:   Allergies   Allergen Reactions    Mefoxin [Cefoxitin] Anaphylaxis  Pcn [Penicillins] Hives    Sulfa Antibiotics Rash       Current Medications:  Current Outpatient Medications   Medication Sig Dispense Refill    losartan (COZAAR) 25 MG tablet Take 25 mg by mouth daily      Tiotropium Bromide-Olodaterol (STIOLTO RESPIMAT IN) Inhale 2 puffs into the lungs daily      spironolactone (ALDACTONE) 25 MG tablet TAKE ONE TABLET BY MOUTH EVERY DAY 90 tablet 3    torsemide (DEMADEX) 20 MG tablet every other day now takes 20 mg 45 tablet 3    atorvastatin (LIPITOR) 20 MG tablet Take 20 mg by mouth daily      carvedilol (COREG) 12.5 MG tablet TAKE ONE TABLET BY MOUTH TWO TIMES A  tablet 3    acetaminophen (TYLENOL) 500 MG tablet Take 1,000 mg by mouth every 6 hours as needed for Pain      Cholecalciferol (VITAMIN D3) 250 MCG (67737 UT) CAPS Take 1 capsule by mouth every morning       FERROUS SULFATE PO Take 5 mg by mouth every evening       allopurinol (ZYLOPRIM) 300 MG tablet Take 300 mg by mouth daily.  buPROPion (WELLBUTRIN XL) 300 MG XL tablet Take 300 mg by mouth every morning.  losartan (COZAAR) 100 MG tablet TAKE ONE TABLET BY MOUTH EVERY DAY (Patient not taking: Reported on 6/3/2021) 60 tablet 5     No current facility-administered medications for this visit.        Physical Exam:  /62   Pulse 72   Resp 16   Ht 5' 1\" (1.549 m)   Wt 206 lb 6.4 oz (93.6 kg)   BMI 39.00 kg/m²   Wt Readings from Last 3 Encounters:   06/03/21 206 lb 6.4 oz (93.6 kg)   05/12/21 205 lb (93 kg)   11/11/20 200 lb (90.7 kg)     Appearance: Awake, alert, no acute respiratory distress  Skin: Intact, no rash  Head: Normocephalic, atraumatic  Eyes: EOMI, no conjunctival erythema  ENMT: No pharyngeal erythema, MMM, no rhinorrhea  Neck: Supple, no carotid bruits  Lungs: Decreased BS B/L, no wheezing  Cardiac: Regular rate and rhythm, +S1S2, no murmurs apparent  Abdomen: Soft, nontender, +bowel sounds  Extremities: Moves all extremities x 4, 1+ lower extremity edema  Neurologic: No focal motor deficits apparent, normal mood and affect, alert and oriented x 3    Laboratory Tests:  Lab Results   Component Value Date    CREATININE 1.5 (H) 11/10/2018    BUN 29 (H) 11/10/2018     11/10/2018    K 4.4 11/10/2018     11/10/2018    CO2 23 11/10/2018     No results found for: MG  Lab Results   Component Value Date    WBC 8.3 11/10/2018    HGB 8.9 (L) 11/10/2018    HCT 29.3 (L) 11/10/2018    .6 (H) 11/10/2018     11/10/2018     Lab Results   Component Value Date    ALT 12 02/14/2018    AST 20 02/14/2018    ALKPHOS 73 02/14/2018    BILITOT 0.6 02/14/2018     Lab Results   Component Value Date    CHOL 173 02/14/2018    CHOL 276 (H) 02/17/2011     Lab Results   Component Value Date    TRIG 55 02/14/2018    TRIG 60 02/17/2011     Lab Results   Component Value Date     02/14/2018    .6 02/17/2011     Lab Results   Component Value Date    LDLCALC 60 02/14/2018    LDLCALC 163 (H) 02/17/2011     Lab Results   Component Value Date    LABVLDL 11 02/14/2018     No results found for: CHOLHDLRATIO  No results for input(s): PROBNP in the last 72 hours. Cardiac Tests:  ECG: SR, rate 72, LBBB    Echocardiogram: 6/22/2020 (Dr. Satya Trujillo)   Normal left ventricular size. Low normal LV systolic function. Ejection fraction is visually estimated at 50-55%. Indeterminate diastolic function. No regional wall motion abnormalities seen. Normal left ventricular wall thickness. Abnormal LV septal motion consistent with conduction disorder. Mildly enlarged right ventricle cavity. Right ventricle global systolic function is normal.   The left atrium is mild dilated. Mildly enlarged right atrium. Mild aortic valve regurgitation. RVSP is 28 mmHg. ASSESSMENT / PLAN:  1. Chronic LBBB present at least since 2003. 2. Abnormal MPS, 01/23/2003. EF 54%. Distal anteroseptal and apical ischemia. 3. Patient reportedly had catheterization at [facility] in 2003.  Records are pending, but the patient was told that the catheterization was normal. 4. Echo, 11/04/2004. EF 45%. Mild PHTN. 5. Echo, 07/17/2006. EF 35-40%. Normal LV dimensions. Global hypokinesis with Stage I diastolic dysfunction. Moderate PHTN with a RVSP of 60 mmHg. 6. Cigarette abuse, abstinent since 12/2006  7. Chronic peripheral edema from varicose veins. 8. Echo, 10/17/2006. LV size upper limits of normal. EF 40-45%. Mild global hypokinesis, Stage II diastolic dysfunction, mild MR, mild TR RVSP upper limits of normal.   9. Diagnosis of obstructive sleep apnea, early 2007, treated with CPAP. 10. Echo, 05/07/2007. Normal LV size with mild global hypokinesis. EF 45-50%. Stage II diastolic dysfunction. Moderate LAE. Trace MR, mild TR. Mild PHTN.   11. OP CBC, 04/14/2008 with Hb 7. She received a transfusion. 12. Postop PE noted on CT angiogram, 04/2008 treated with heparin and then Coumadin. 13. Echo, 06/02/2008. Mild LV dilation. Normal systolic function. EF >55%. Normal diastolic function. Normal tissue Doppler and pulmonary venous flow patterns. Mild LAE. Mild MR, mild TR, mild PHTN. 14. Hospitalization, 08/2008 with atypical chest discomfort. No evidence for acute MI, stress test unremarkable. 15. Episode of syncope after laughing, 04/17/2009. 16. Echo, 04/28/2009 with mild LVE, diastolic dimension 6.3 cm. Borderline global hypokinesis, EF 46%. Diastolic function normal. Mild LAE. MAC with mild MR. Aortic sclerosis without stenosis. RVSP 39 mmHg. 17. 24 hour Holter monitor, 04/20/2009. Normal rate variation, LBBB. Occasional PAC's and PVC's, but no VT or pauses. 18. Echo, 07/08/2013. Mildly dilated LV with normal wall thickness, regional wall motion and systolic function. ~ EF >65%. Stage II diastolic dysfunction. Moderate ISRA. Mild MR and TR.  19. Severe anemia with Hgb 7.5 on 07/10/2013. History of severe anemia with hemoglobin 7.5, 07/10/2013. Hemoglobin on 10/16/2017 was 11.4.    20.  Echocardiogram, 10/16/2017, moderately dilated left ventricle with ejection fraction 55%.  No regional wall motion abnormalities.  Stage 1 diastolic relaxation abnormalities, trace aortic insufficiency without stenosis.  Otherwise, unremarkable. - Results of 6/2020 echocardiogram reviewed with the patient today  - Monitor renal function and electrolytes closely on diuretics and ARB  - Continue coreg  - Treatment of PARK (compliant with BiPAP)  - Patient educated today re: LBBB and symptoms to monitor for  - Follow-up with Dr. Kasandra Bridges as scheduled)    Greater than 30 minutes was spent counseling the patient, reviewing the rationale for the above recommendations and reviewing the patient's current medication list, problem list and results of all previously ordered testing.       Hammad Arias MD  [facility]) Cardiology

## 2021-11-17 ENCOUNTER — HOSPITAL ENCOUNTER (OUTPATIENT)
Dept: INFUSION THERAPY | Age: 76
Setting detail: INFUSION SERIES
Discharge: HOME | End: 2021-11-17
Payer: MEDICARE

## 2021-11-17 VITALS
HEIGHT: 61 IN | WEIGHT: 200 LBS | HEART RATE: 72 BPM | RESPIRATION RATE: 18 BRPM | TEMPERATURE: 97.9 F | DIASTOLIC BLOOD PRESSURE: 71 MMHG | OXYGEN SATURATION: 97 % | SYSTOLIC BLOOD PRESSURE: 110 MMHG | BODY MASS INDEX: 37.76 KG/M2

## 2021-11-17 DIAGNOSIS — M81.0 SENILE OSTEOPOROSIS: Primary | ICD-10-CM

## 2021-11-17 PROCEDURE — 96372 THER/PROPH/DIAG INJ SC/IM: CPT

## 2021-11-17 PROCEDURE — 6360000002 HC RX W HCPCS: Performed by: OBSTETRICS & GYNECOLOGY

## 2021-11-17 RX ADMIN — DENOSUMAB 60 MG: 60 INJECTION SUBCUTANEOUS at 10:40

## 2021-11-17 NOTE — PROGRESS NOTES
Patient tolerated injection well. Therapy plan reviewed with patient. Verbalizes understanding. Reviewed AVS with patient, reviewed medication information, verbalizes good knowledge of current plan, and has no signs or symptoms to report at this time. Next appointment made and patient instructed on lab draw and procedure for next injection.

## 2022-05-17 ENCOUNTER — HOSPITAL ENCOUNTER (OUTPATIENT)
Dept: INFUSION THERAPY | Age: 77
Setting detail: INFUSION SERIES
Discharge: HOME | End: 2022-05-17
Payer: MEDICARE

## 2022-05-17 VITALS
OXYGEN SATURATION: 94 % | TEMPERATURE: 97.8 F | BODY MASS INDEX: 37.76 KG/M2 | SYSTOLIC BLOOD PRESSURE: 127 MMHG | HEART RATE: 77 BPM | WEIGHT: 200 LBS | RESPIRATION RATE: 18 BRPM | DIASTOLIC BLOOD PRESSURE: 63 MMHG | HEIGHT: 61 IN

## 2022-05-17 DIAGNOSIS — M81.0 SENILE OSTEOPOROSIS: Primary | ICD-10-CM

## 2022-05-17 PROCEDURE — 6360000002 HC RX W HCPCS: Performed by: OBSTETRICS & GYNECOLOGY

## 2022-05-17 PROCEDURE — 96372 THER/PROPH/DIAG INJ SC/IM: CPT

## 2022-05-17 RX ADMIN — DENOSUMAB 60 MG: 60 INJECTION SUBCUTANEOUS at 10:34

## 2022-05-17 NOTE — PROGRESS NOTES
Tolerated prolia injection well. Reviewed therapy plan, offered education material and/or discharge material, reviewed medication information and signs and symptoms  and educated on possible side effects, verbalizes good knowledge of current plan patient verbalizes understanding, and has no signs or symptoms to report at this time. Patient discharged. Patient alert and oriented x3. No distress noted. Vital signs stable. Patient denies any new or worsening pain. Patient denies any needs. All questions answered. Next appointment scheduled. Declines copy of AVS. Instructed on lab draw for next injection.

## 2022-11-17 ENCOUNTER — HOSPITAL ENCOUNTER (OUTPATIENT)
Dept: INFUSION THERAPY | Age: 77
Setting detail: INFUSION SERIES
Discharge: HOME | End: 2022-11-17
Payer: MEDICARE

## 2022-11-17 VITALS
OXYGEN SATURATION: 98 % | HEART RATE: 68 BPM | RESPIRATION RATE: 18 BRPM | DIASTOLIC BLOOD PRESSURE: 57 MMHG | BODY MASS INDEX: 37.57 KG/M2 | WEIGHT: 199 LBS | HEIGHT: 61 IN | TEMPERATURE: 96.4 F | SYSTOLIC BLOOD PRESSURE: 122 MMHG

## 2022-11-17 DIAGNOSIS — M81.0 SENILE OSTEOPOROSIS: Primary | ICD-10-CM

## 2022-11-17 PROCEDURE — 6360000002 HC RX W HCPCS: Performed by: OBSTETRICS & GYNECOLOGY

## 2022-11-17 PROCEDURE — 96372 THER/PROPH/DIAG INJ SC/IM: CPT

## 2022-11-17 RX ADMIN — DENOSUMAB 60 MG: 60 INJECTION SUBCUTANEOUS at 10:31

## 2022-11-17 NOTE — PROGRESS NOTES
Tolerated injection well. Reviewed therapy plan, offered education material and/or discharge material, reviewed medication information and signs and symptoms  and educated on possible side effects, verbalizes good knowledge of current plan patient verbalizes understanding, and has no signs or symptoms to report at this time. Patient discharged. Patient alert and oriented x3. No distress noted. Vital signs stable. Patient denies any new or worsening pain. Patient denies any needs. All questions answered. Next appointment scheduled. declinescopy of AVS. Instructed on lab draw for next injection.

## 2023-05-17 ENCOUNTER — HOSPITAL ENCOUNTER (OUTPATIENT)
Dept: INFUSION THERAPY | Age: 78
Setting detail: INFUSION SERIES
Discharge: HOME | End: 2023-05-17
Payer: MEDICARE

## 2023-05-17 VITALS
TEMPERATURE: 97.2 F | DIASTOLIC BLOOD PRESSURE: 73 MMHG | RESPIRATION RATE: 16 BRPM | BODY MASS INDEX: 36.28 KG/M2 | SYSTOLIC BLOOD PRESSURE: 125 MMHG | WEIGHT: 192 LBS | HEART RATE: 78 BPM

## 2023-05-17 DIAGNOSIS — M81.0 SENILE OSTEOPOROSIS: Primary | ICD-10-CM

## 2023-05-17 PROCEDURE — 6360000002 HC RX W HCPCS: Performed by: OBSTETRICS & GYNECOLOGY

## 2023-05-17 PROCEDURE — 96372 THER/PROPH/DIAG INJ SC/IM: CPT

## 2023-05-17 RX ADMIN — DENOSUMAB 60 MG: 60 INJECTION SUBCUTANEOUS at 10:18

## 2023-05-17 ASSESSMENT — PAIN DESCRIPTION - ORIENTATION: ORIENTATION: RIGHT

## 2023-05-17 ASSESSMENT — PAIN DESCRIPTION - DESCRIPTORS: DESCRIPTORS: ACHING;DISCOMFORT

## 2023-05-17 ASSESSMENT — PAIN SCALES - GENERAL: PAINLEVEL_OUTOF10: 4

## 2023-05-17 ASSESSMENT — PAIN DESCRIPTION - LOCATION: LOCATION: BACK

## 2023-05-17 NOTE — PROGRESS NOTES
Before  PROLIA  INJECTION patient declined any infections, open wounds, or change in medical condition. Tolerated infusion well. Reviewed therapy plan, offered education material and/or discharge material, reviewed medication information and signs and symptoms  and educated on possible side effects, verbalizes good knowledge of current plan patient verbalizes understanding, and has no signs or symptoms to report at this time. Patient discharged. Patient alert and oriented x3. No distress noted. Vital signs stable. Patient denies any new or worsening pain. Patient denies any needs. All questions answered.   Next appointment scheduleD

## 2023-11-29 ENCOUNTER — HOSPITAL ENCOUNTER (OUTPATIENT)
Dept: INFUSION THERAPY | Age: 78
Setting detail: INFUSION SERIES
Discharge: HOME | End: 2023-11-29
Payer: MEDICARE

## 2023-11-29 VITALS
OXYGEN SATURATION: 93 % | BODY MASS INDEX: 36.25 KG/M2 | WEIGHT: 192 LBS | RESPIRATION RATE: 16 BRPM | HEIGHT: 61 IN | TEMPERATURE: 97.4 F | DIASTOLIC BLOOD PRESSURE: 63 MMHG | SYSTOLIC BLOOD PRESSURE: 132 MMHG | HEART RATE: 69 BPM

## 2023-11-29 DIAGNOSIS — M81.0 SENILE OSTEOPOROSIS: Primary | ICD-10-CM

## 2023-11-29 PROCEDURE — 96372 THER/PROPH/DIAG INJ SC/IM: CPT

## 2023-11-29 PROCEDURE — 6360000002 HC RX W HCPCS: Performed by: OBSTETRICS & GYNECOLOGY

## 2023-11-29 RX ADMIN — DENOSUMAB 60 MG: 60 INJECTION SUBCUTANEOUS at 10:05

## 2024-03-07 ENCOUNTER — TELEPHONE (OUTPATIENT)
Dept: CARDIOLOGY CLINIC | Age: 79
End: 2024-03-07

## 2024-03-07 NOTE — TELEPHONE ENCOUNTER
Patient needs cardiac clearance for a CYSTOSCOPY RIGHT EXTERNAL URETERAL CATHETER INSERTION LAPAROSCOPIC ROBOTIC XI ASSISTED ABDOMINAL SACRAL COLPOPEXY WITH Y MESH, patient was last seen in 6/2021 and she denies any chest pain,SOB or palpitations and she is able to perform daily activities without any cardiac symptoms,please advise

## 2024-03-08 NOTE — TELEPHONE ENCOUNTER
Patient was  notified and note was faxed to [facility] urology at [phone].

## 2024-04-03 ENCOUNTER — HOSPITAL ENCOUNTER (OUTPATIENT)
Age: 79
Discharge: HOME | End: 2024-04-05
Payer: MEDICARE

## 2024-04-03 ENCOUNTER — HOSPITAL ENCOUNTER (OUTPATIENT)
Dept: GENERAL RADIOLOGY | Age: 79
Discharge: HOME | End: 2024-04-05
Payer: MEDICARE

## 2024-04-03 DIAGNOSIS — J44.9 CHRONIC OBSTRUCTIVE PULMONARY DISEASE, UNSPECIFIED COPD TYPE (HCC): ICD-10-CM

## 2024-04-03 PROCEDURE — 71046 X-RAY EXAM CHEST 2 VIEWS: CPT

## 2024-04-29 ENCOUNTER — TELEPHONE (OUTPATIENT)
Age: 79
End: 2024-04-29

## 2024-05-21 NOTE — PROGRESS NOTES
Faxed request for script and labs to Dr. Miranda's office called patent to remind her to get labs she said had already at [facility] called to her PCP Dr. Lopez's office and spoke with Trang she will fax results over.

## 2024-05-24 ENCOUNTER — HOSPITAL ENCOUNTER (OUTPATIENT)
Age: 79
Discharge: HOME | End: 2024-05-26

## 2024-05-28 NOTE — PROGRESS NOTES
Spoke to  Dr. Rodarte office about needing a prolia script for tomorrow. States patient has not been the office for over a year and they will not send a script at this time. Called patient who stated she will call and make an appointment with the office then will call us back to reschedule

## 2024-05-29 ENCOUNTER — HOSPITAL ENCOUNTER (OUTPATIENT)
Dept: INFUSION THERAPY | Age: 79
Setting detail: INFUSION SERIES
Discharge: HOME | End: 2024-05-29

## 2024-06-04 LAB — SURGICAL PATHOLOGY REPORT: NORMAL

## 2024-06-13 ENCOUNTER — APPOINTMENT (OUTPATIENT)
Dept: UROLOGY | Facility: CLINIC | Age: 79
End: 2024-06-13
Payer: MEDICARE

## 2024-06-13 VITALS — HEART RATE: 78 BPM | SYSTOLIC BLOOD PRESSURE: 114 MMHG | DIASTOLIC BLOOD PRESSURE: 69 MMHG | TEMPERATURE: 97.3 F

## 2024-06-13 DIAGNOSIS — N81.9 FEMALE GENITAL PROLAPSE, UNSPECIFIED TYPE: ICD-10-CM

## 2024-06-13 DIAGNOSIS — R32 URINARY INCONTINENCE, UNSPECIFIED TYPE: ICD-10-CM

## 2024-06-13 DIAGNOSIS — N81.10 FEMALE BLADDER PROLAPSE: ICD-10-CM

## 2024-06-13 PROCEDURE — 51798 US URINE CAPACITY MEASURE: CPT | Performed by: UROLOGY

## 2024-06-13 PROCEDURE — 1159F MED LIST DOCD IN RCRD: CPT | Performed by: UROLOGY

## 2024-06-13 PROCEDURE — 99204 OFFICE O/P NEW MOD 45 MIN: CPT | Performed by: UROLOGY

## 2024-06-13 RX ORDER — LOSARTAN POTASSIUM 25 MG/1
25 TABLET ORAL DAILY
COMMUNITY

## 2024-06-13 RX ORDER — SPIRONOLACTONE 25 MG/1
25 TABLET ORAL ONCE
COMMUNITY

## 2024-06-13 RX ORDER — ALLOPURINOL 300 MG/1
300 TABLET ORAL DAILY
COMMUNITY

## 2024-06-13 RX ORDER — ATORVASTATIN CALCIUM 40 MG/1
40 TABLET, FILM COATED ORAL DAILY
COMMUNITY
Start: 2024-04-02

## 2024-06-13 RX ORDER — CALCITRIOL 0.25 UG/1
0.25 CAPSULE ORAL DAILY
COMMUNITY
Start: 2024-05-06

## 2024-06-13 RX ORDER — DENOSUMAB 60 MG/ML
60 INJECTION SUBCUTANEOUS
COMMUNITY

## 2024-06-13 RX ORDER — CARVEDILOL 12.5 MG/1
25 TABLET ORAL
COMMUNITY

## 2024-06-13 RX ORDER — CEPHRADINE 500 MG
1 CAPSULE ORAL DAILY
COMMUNITY

## 2024-06-13 RX ORDER — BUPROPION HYDROCHLORIDE 300 MG/1
300 TABLET ORAL EVERY MORNING
COMMUNITY

## 2024-06-13 RX ORDER — TORSEMIDE 20 MG/1
20 TABLET ORAL DAILY
COMMUNITY

## 2024-06-13 ASSESSMENT — ENCOUNTER SYMPTOMS
OCCASIONAL FEELINGS OF UNSTEADINESS: 1
DEPRESSION: 1
LOSS OF SENSATION IN FEET: 0

## 2024-06-13 NOTE — PROGRESS NOTES
"Referred by:  Elvin Lopez MD         CHIEF COMPLAINT:  Pelvic Organ Prolapse, Stool coming out of Vagina, recurrent UTIs         HISTORY OF PRESENT ILLNESS:  This is a  78 y.o. female, No obstetric history on file. who presents with pelvic organ prolapse \"the size of a pear/water-balloon\"\", fecal matter exiting from the vagina, and recurrent UTIs. She currently has a UTI and has been taking Macrobid for the last 5 weeks.    Her pelvic organ prolapse began in 1990/1991 where she experienced uterine prolapse and underwent a vaginal hysterectomy in 1991. She has experienced prolapse since and tried a ring pessary 5-6 years back, and switched to a Gellhorn pessary 3 years ago. 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    She also experiences urinary urgency about \"a dozen times a day\" with movement/physical exertion, but does not pass urine everytime she feels urgency. She leaks in the morning on the way to the toilet occasionally. She occasionally leaks on stress (cough/laugh/sneeze), but it is not as noticeable as the urgency.    She experiences chafing and bruising of the prolapsed tissue and has not tried anything specific to alleviate the symptoms.      Past medical and surgical hx reviewed - pertinent for hysterectomy done in 1991 due to uterine prolapse, pitting edema in the legs for which she is being medically treated,         Specifically, she describes the following pelvic floor symptoms:          Prolapse: Yes       - Splinting: No              Incontinence:  Yes             Mixed              Urinary Symptoms: urgency       - Fluid intake: approx 20-32 ounces of water, 2 cups of coffee, and some sugar free iced tea          History: recurrent UTI, last 1 now and " none                  Bowel Symptoms: regular, easy to pass         OB/Gyn History:  - Menopausal: Yes           Postmenopausal bleeding: No  - HRT: No  - Pap up to date: Yes - Hysterectomy in 1991, no cervix   History of abnormal pap: No  - Sexually active:  No    - Number of prior vaginal deliveries: 3   Number of prior operative deliveries 0  - Number of prior c-sections: 0    - Mammogram up to date: Yes - Normal  - Colonoscopy up to date: Yes - Rectovaginal fistula, otherwise normal colonoscopy          PHYSICAL EXAMINATION:  No LMP recorded.  There is no height or weight on file to calculate BMI.  ,  Vitals:    06/13/24 1453   BP: 114/69   Pulse: 78   Temp: 36.3 °C (97.3 °F)       General Appearance: well appearing  Neuro: Alert and oriented     Pelvic:  Genitourinary: Fecal matter present on the genital hiatus and labia; normal external genitalia, Bartholin's glands negative, Whitten's glands negative    Urethra: normal meatus, non-tender, no periurethral mass    Vaginal mucosa shows ulceration and a large amount of granulation tissue on left vaginal wall 5 cm proximal to the hymen. There is ulceration and scarring present at the apex of the vagina. Upon valsalva maneuver, fecal matter enters into the vagina and onto speculum.    Cervix surgically absent    Uterus surgically absent    Adnexae: negative nontender, no masses    Atrophy positive    CST positive      POP-Q (in supine position):       Aa +3     Ba +3     C 0              gh 3.5     pb 4     tvl 7              Ap -1     Bp -1     D NA (Hysterectomy)    Rectal: visibly concentric squeeze; no hemorrhoids, fissures or masses; able to feel where the fistula is by palpating deeper in the rectum    PVR (by Ultrasound): 216   Urine dip: No results found for this or any previous visit (from the past 24 hour(s)).      IMPRESSION AND PLAN:  Vilma Sagastume is a 78 y.o. who presents with pelvic organ prolapse posthysterectomy, urinary urgency, recurrent UTIs and  a rectovaginal fistula.     The plan by Dr. Rivers is to discuss this case with Dr. Wright who the patient is scheduled to have an appointment with on July 9, 2024, to discuss surgical treatment options.     The patient is to get a urodynamic test done and follow up after results are available via a virtual appointment.    Urine culture will be conducted since patient was unable to urinate initially during appointment and unable to give samples. If sample comes back with a positive culture, she will be switched to Ciprofloxacin as per Dr. Rivers.    Follow up after UDS testing done.    Patient seen and discussed with Dr. Rivers. All questions and concerns were answered and addressed.  The patient expressed understanding and agrees with the plan.     PRATEEK BLACKMONDEEJAY  [unfilled]    6/13/2024    I personally verified?and edited?the documentation of the medical student including the key elements of the history and confirm its accuracy. I personally performed the physical examination and medical decision making as documented in the medical student's note and confirm its accuracy. The medical decision making for this service was based on my clinical judgement.     Bethel Rivers MD   [phone]

## 2024-06-14 ENCOUNTER — LAB (OUTPATIENT)
Dept: LAB | Facility: LAB | Age: 79
End: 2024-06-14
Payer: MEDICARE

## 2024-06-14 DIAGNOSIS — R32 URINARY INCONTINENCE, UNSPECIFIED TYPE: ICD-10-CM

## 2024-06-14 PROCEDURE — 87086 URINE CULTURE/COLONY COUNT: CPT

## 2024-06-15 LAB — BACTERIA UR CULT: NO GROWTH

## 2024-06-18 ENCOUNTER — TELEPHONE (OUTPATIENT)
Dept: UROLOGY | Facility: CLINIC | Age: 79
End: 2024-06-18
Payer: MEDICARE

## 2024-06-20 ENCOUNTER — APPOINTMENT (OUTPATIENT)
Dept: UROLOGY | Facility: CLINIC | Age: 79
End: 2024-06-20
Payer: MEDICARE

## 2024-06-25 ENCOUNTER — APPOINTMENT (OUTPATIENT)
Dept: UROLOGY | Facility: CLINIC | Age: 79
End: 2024-06-25
Payer: MEDICARE

## 2024-06-25 DIAGNOSIS — R32 URINARY INCONTINENCE, UNSPECIFIED TYPE: ICD-10-CM

## 2024-06-25 PROCEDURE — 51728 CYSTOMETROGRAM W/VP: CPT | Performed by: NURSE PRACTITIONER

## 2024-06-25 PROCEDURE — 51797 INTRAABDOMINAL PRESSURE TEST: CPT | Performed by: NURSE PRACTITIONER

## 2024-06-25 PROCEDURE — 51784 ANAL/URINARY MUSCLE STUDY: CPT | Performed by: NURSE PRACTITIONER

## 2024-06-25 PROCEDURE — 51741 ELECTRO-UROFLOWMETRY FIRST: CPT | Performed by: NURSE PRACTITIONER

## 2024-06-25 RX ORDER — DEXTROMETHORPHAN HYDROBROMIDE, GUAIFENESIN 5; 100 MG/5ML; MG/5ML
650 LIQUID ORAL EVERY 8 HOURS PRN
COMMUNITY

## 2024-06-25 RX ORDER — PHENAZOPYRIDINE HYDROCHLORIDE 95 MG/1
95 TABLET ORAL 3 TIMES DAILY PRN
COMMUNITY

## 2024-06-25 NOTE — PROGRESS NOTES
Vilma Sagastume 78 y.o. female  Procedures:  Patient referred by Dr. Rivers for urodynamics to evaluate mixed incontinence, female organ prolapse, incomplete bladder emptying and uti. Krista Sylvester CNP was present in office at time of study. Pre-uroflow was completed with a pvr of 350 ml. Urine was clear for uti today. Patient did leak on CLPP/VLPP w/swab reduced. Pvr after study was 388 ml.  Patient instructed to increase fluids if she has any burning or blood in urine. Patient made aware she may have blood vaginally/rectally due to abdominal catheter insertion.  Patient understood and consented to urodynamics. Patient will follow up with Dr. Rivers to review results. 06/25/2024/fredi

## 2024-06-25 NOTE — PROGRESS NOTES
CHIEF COMPLAINT:  ***  An interactive audio and video telecommunication system which permits real time communications between the patient (at the originating site) and provider (at the distant site) was utilized to provide this telehealth service.    Verbal consent was requested and obtained fromNAME@ on this date,DATE@ , for a telehealth visit.     A telephone visit (audio only) between the patient (at the originating site) and the provider (at the distant site) was utilized to provide this telehealth service.    Verbal consent was requested and obtained fromNAME@ on this date, [unfilled] , for a telehealth visit.     HISTORY OF PRESENT ILLNESS:    This is a  78 y.o. female who presents for follow-up for UDS review.  On uroflow, the patient voided a total of 22 cc leaving a residual of 309 cc in her bladder.  On CMG she had the first desire to void at 120 cc, strong desire at 348 cc and capacity of 453 ml. patient leaked at bladder volume of 150 cc with Valsalva and a leak point pressure of 77 cm of water. there was no evidence of detrusor overactivity .on pressure flow study, patient voided only 65 cc with peak flow of 7 cc/s leaving a residual of 388 cc in her bladder.  Note that the pressure flow study was done with reduction of prolapse.    This urodynamic shows evidence of stress urinary incontinence with reduction of prolapse and evidence of persistent incomplete bladder emptying despite reduction of prolapse.        Extract from my last note 6/13/24  Date  The plan by Dr. Rivers is to discuss this case with Dr. Wright who the patient is scheduled to have an appointment with on July 9, 2024, to discuss surgical treatment options.      The patient is to get a urodynamic test done and follow up after results are available via a virtual appointment.     Urine culture will be conducted since patient was unable to urinate initially during appointment and unable to give samples. If sample comes back with a positive  culture, she will be switched to Ciprofloxacin as per Dr. Rivers.     Follow up after UDS testing done.      Review of Systems    IMPRESSION AND PLAN     Vilma Sagastume is a 78 y.o. who presents with pelvic organ prolapse posthysterectomy, urinary urgency, recurrent UTIs and a rectovaginal fistula.      Vilma Sagastume is a 78 y.o. who presents with ***      All questions and concerns were answered and addressed.  The patient expressed understanding and agrees with the plan.       SIGNATURES  Bethel Rivers MD  [phone]

## 2024-06-26 ENCOUNTER — TELEMEDICINE (OUTPATIENT)
Dept: UROLOGY | Facility: CLINIC | Age: 79
End: 2024-06-26
Payer: MEDICARE

## 2024-06-26 DIAGNOSIS — N81.10 FEMALE BLADDER PROLAPSE: Primary | ICD-10-CM

## 2024-06-26 DIAGNOSIS — R32 URINARY INCONTINENCE, UNSPECIFIED TYPE: ICD-10-CM

## 2024-06-26 PROCEDURE — 51741 ELECTRO-UROFLOWMETRY FIRST: CPT | Performed by: UROLOGY

## 2024-06-26 PROCEDURE — 51729 CYSTOMETROGRAM W/VP&UP: CPT | Performed by: UROLOGY

## 2024-06-26 PROCEDURE — 51797 INTRAABDOMINAL PRESSURE TEST: CPT | Performed by: UROLOGY

## 2024-06-26 PROCEDURE — 99214 OFFICE O/P EST MOD 30 MIN: CPT | Performed by: UROLOGY

## 2024-06-26 PROCEDURE — 51784 ANAL/URINARY MUSCLE STUDY: CPT | Performed by: UROLOGY

## 2024-07-02 ENCOUNTER — OFFICE VISIT (OUTPATIENT)
Dept: CARDIOLOGY CLINIC | Age: 79
End: 2024-07-02
Payer: MEDICARE

## 2024-07-02 VITALS
RESPIRATION RATE: 18 BRPM | HEIGHT: 61 IN | BODY MASS INDEX: 34.44 KG/M2 | WEIGHT: 182.4 LBS | DIASTOLIC BLOOD PRESSURE: 62 MMHG | SYSTOLIC BLOOD PRESSURE: 118 MMHG | HEART RATE: 57 BPM

## 2024-07-02 DIAGNOSIS — I44.7 LEFT BUNDLE BRANCH BLOCK: ICD-10-CM

## 2024-07-02 DIAGNOSIS — I42.0 DILATED CARDIOMYOPATHY (HCC): ICD-10-CM

## 2024-07-02 DIAGNOSIS — I50.22 CHRONIC SYSTOLIC CONGESTIVE HEART FAILURE (HCC): Primary | Chronic | ICD-10-CM

## 2024-07-02 DIAGNOSIS — G47.33 OSA (OBSTRUCTIVE SLEEP APNEA): ICD-10-CM

## 2024-07-02 PROCEDURE — 1090F PRES/ABSN URINE INCON ASSESS: CPT | Performed by: INTERNAL MEDICINE

## 2024-07-02 PROCEDURE — G8417 CALC BMI ABV UP PARAM F/U: HCPCS | Performed by: INTERNAL MEDICINE

## 2024-07-02 PROCEDURE — G8400 PT W/DXA NO RESULTS DOC: HCPCS | Performed by: INTERNAL MEDICINE

## 2024-07-02 PROCEDURE — 99214 OFFICE O/P EST MOD 30 MIN: CPT | Performed by: INTERNAL MEDICINE

## 2024-07-02 PROCEDURE — 93000 ELECTROCARDIOGRAM COMPLETE: CPT | Performed by: INTERNAL MEDICINE

## 2024-07-02 PROCEDURE — G8427 DOCREV CUR MEDS BY ELIG CLIN: HCPCS | Performed by: INTERNAL MEDICINE

## 2024-07-02 PROCEDURE — 1123F ACP DISCUSS/DSCN MKR DOCD: CPT | Performed by: INTERNAL MEDICINE

## 2024-07-02 PROCEDURE — 1036F TOBACCO NON-USER: CPT | Performed by: INTERNAL MEDICINE

## 2024-07-02 RX ORDER — CALCITRIOL 0.25 UG/1
0.25 CAPSULE, LIQUID FILLED ORAL DAILY
COMMUNITY
Start: 2024-05-06

## 2024-07-02 RX ORDER — IBUPROFEN 200 MG
200 TABLET ORAL EVERY 6 HOURS PRN
COMMUNITY

## 2024-07-02 RX ORDER — NITROFURANTOIN 25; 75 MG/1; MG/1
100 CAPSULE ORAL 2 TIMES DAILY
COMMUNITY
Start: 2024-06-15

## 2024-07-02 RX ORDER — PHENAZOPYRIDINE HYDROCHLORIDE 95 MG/1
95 TABLET ORAL 3 TIMES DAILY PRN
COMMUNITY

## 2024-07-02 NOTE — PROGRESS NOTES
trace aortic insufficiency without stenosis.  Otherwise, unremarkable.     - Results of 6/2020 echocardiogram reviewed with the patient today --> serial echocardiograms  - Monitor renal function and electrolytes closely on diuretics and ARB  - Continue coreg  - Treatment of PARK (compliant with BiPAP)  - Patient educated again today re: LBBB and symptoms to monitor for  - Follow-up with Dr. Gage as scheduled    Greater than 30 minutes was spent counseling the patient, reviewing the rationale for the above recommendations and reviewing the patient's current medication list, problem list and results of all previously ordered testing.      Stevo Delgado MD  [facility] Cardiology

## 2024-07-02 NOTE — PROGRESS NOTES
HISTORY OF PRESENTING ILLNESS: Vilma Sagastume is a 78yoF with a colovaginal fistula.  She was referred by Dr. Rivers.      She admits that over the last couple months she notes that she has had stool per vagina.  She denies any abdominal pain.  Details of her current condition can be seen in Dr. Rivers's note dated 6/13/2024.    Colonoscopy, 5/24/24 with Dr. Ethan Simmons: Digital rectal exam was conducted.  A rectal defect was palpated.  Colonoscope was advanced with minimal pressure by nursing staff.  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  I cannulated through the rectosigmoid area without difficulty.  I continued to pass the scope through the entire colon into the cecum.  Sigmoid diverticulosis was identified.  Ileocecal valve, appendiceal orifice was visualized.  Convergence of the taenia was visualized.  1 distal rectal polyp was identified and removed using cold snare polypectomy.  Hemostasis was excellent.  I slowly withdrew the scope back, encountering normal mucosa throughout except for above findings.  Good bowel prep was encountered.  Upon retroflexion in the rectum, no significant hemorrhoids were present.  Otherwise normal colonoscopy was observed.      Pathology: Distal rectal polyp, polypectomy: Hyperplastic polyp         Past Medical History:   Diagnosis Date    Prolapse of female pelvic organs          Past Surgical History:   Procedure Laterality Date    HYSTERECTOMY           Social History     Tobacco Use    Smoking status: Never    Smokeless tobacco: Never   Substance Use Topics    Alcohol use: Yes    Drug use: Never         No family history on file.        Current Outpatient Medications:     acetaminophen (Tylenol 8 HOUR) 650 mg ER tablet, Take 1 tablet (650 mg) by mouth every 8 hours if needed for mild pain (1 - 3). Do not crush, chew, or split., Disp: , Rfl:     allopurinol (Zyloprim) 300 mg tablet, Take 1 tablet (300 mg) by  mouth once daily., Disp: , Rfl:     atorvastatin (Lipitor) 40 mg tablet, Take 1 tablet (40 mg) by mouth once daily., Disp: , Rfl:     buPROPion XL (Wellbutrin XL) 300 mg 24 hr tablet, Take 1 tablet (300 mg) by mouth once daily in the morning., Disp: , Rfl:     calcitriol (Rocaltrol) 0.25 mcg capsule, Take 1 capsule (0.25 mcg) by mouth once daily., Disp: , Rfl:     carvedilol (Coreg) 12.5 mg tablet, Take 2 tablets (25 mg) by mouth., Disp: , Rfl:     cholecalciferol, vitamin D3, 250 mcg (10,000 unit) capsule, Take 1 capsule (250 mcg) by mouth once daily., Disp: , Rfl:     denosumab (Prolia) 60 mg/mL syringe, Inject 1 mL (60 mg) under the skin., Disp: , Rfl:     losartan (Cozaar) 25 mg tablet, Take 1 tablet (25 mg) by mouth once daily., Disp: , Rfl:     phenazopyridine (Urinary Pain Relief) 95 mg tablet, Take 1 tablet (95 mg) by mouth 3 times a day as needed for bladder spasms., Disp: , Rfl:     spironolactone (Aldactone) 25 mg tablet, Take 1 tablet (25 mg) by mouth 1 time., Disp: , Rfl:     torsemide (Demadex) 20 mg tablet, Take 1 tablet (20 mg) by mouth once daily., Disp: , Rfl:        Allergies   Allergen Reactions    Cefoxitin Anaphylaxis    Nickel Rash    Penicillins Hives    Sulfa (Sulfonamide Antibiotics) Rash         REVIEW OF SYSTEMS: As stated in the HPI above. All other systems negative.    Review of Systems    Labs:       Imaging:      Physical Exam:  Physical Exam  Constitutional:       Appearance: Normal appearance.   HENT:      Head: Normocephalic and atraumatic.   Eyes:      Extraocular Movements: Extraocular movements intact.   Cardiovascular:      Rate and Rhythm: Normal rate.   Pulmonary:      Effort: Pulmonary effort is normal.   Abdominal:      General: Abdomen is flat.   Skin:     General: Skin is warm and dry.   Neurological:      General: No focal deficit present.      Mental Status: She is alert and oriented to person, place, and time.   Psychiatric:         Mood and Affect: Mood normal.        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  After reviewing the chart I we will need to perform an exam under anesthesia to get a better sense of the exact location of the presence of this fistula.  Once this has been completed I will discuss my findings with Dr. Mckeon to coordinate a combined procedure to help this patient.       MD Angelika Leggett RN   7/2/2024

## 2024-07-09 ENCOUNTER — PREP FOR PROCEDURE (OUTPATIENT)
Dept: SURGERY | Facility: CLINIC | Age: 79
End: 2024-07-09
Payer: MEDICARE

## 2024-07-09 ENCOUNTER — OFFICE VISIT (OUTPATIENT)
Dept: SURGERY | Facility: CLINIC | Age: 79
End: 2024-07-09
Payer: MEDICARE

## 2024-07-09 VITALS
SYSTOLIC BLOOD PRESSURE: 111 MMHG | DIASTOLIC BLOOD PRESSURE: 74 MMHG | WEIGHT: 177 LBS | TEMPERATURE: 98.1 F | HEIGHT: 61 IN | BODY MASS INDEX: 33.42 KG/M2 | HEART RATE: 75 BPM

## 2024-07-09 DIAGNOSIS — N82.4 COLOVAGINAL FISTULA: Primary | ICD-10-CM

## 2024-07-09 PROCEDURE — 1159F MED LIST DOCD IN RCRD: CPT | Performed by: COLON & RECTAL SURGERY

## 2024-07-09 PROCEDURE — 99215 OFFICE O/P EST HI 40 MIN: CPT | Performed by: COLON & RECTAL SURGERY

## 2024-07-09 PROCEDURE — 99205 OFFICE O/P NEW HI 60 MIN: CPT | Performed by: COLON & RECTAL SURGERY

## 2024-07-09 RX ORDER — LOPERAMIDE HCL 2 MG
2 TABLET ORAL 4 TIMES DAILY PRN
COMMUNITY

## 2024-07-09 RX ORDER — TIOTROPIUM BROMIDE AND OLODATEROL 3.124; 2.736 UG/1; UG/1
2 SPRAY, METERED RESPIRATORY (INHALATION) DAILY
COMMUNITY

## 2024-07-09 RX ORDER — SODIUM CHLORIDE, SODIUM LACTATE, POTASSIUM CHLORIDE, CALCIUM CHLORIDE 600; 310; 30; 20 MG/100ML; MG/100ML; MG/100ML; MG/100ML
10 INJECTION, SOLUTION INTRAVENOUS CONTINUOUS
OUTPATIENT
Start: 2024-07-09

## 2024-07-09 NOTE — PATIENT INSTRUCTIONS

## 2024-08-08 ENCOUNTER — APPOINTMENT (OUTPATIENT)
Dept: UROLOGY | Facility: CLINIC | Age: 79
End: 2024-08-08
Payer: MEDICARE

## 2024-08-08 DIAGNOSIS — R30.0 DYSURIA: ICD-10-CM

## 2024-08-08 DIAGNOSIS — N95.2 ATROPHIC VAGINITIS: ICD-10-CM

## 2024-08-08 RX ORDER — ESTRADIOL 0.1 MG/G
CREAM VAGINAL
Qty: 42.5 G | Refills: 5 | Status: SHIPPED | OUTPATIENT
Start: 2024-08-08 | End: 2025-08-08

## 2024-08-08 NOTE — PROGRESS NOTES
Patient called with concerns of burning with urination, cloudy urine. She states she has vaginal dryness and chaffing. She discussed a cream with Dr. Rivers's medical student previously and was interested in trying cream for her irritation. Urine culture ordered to rule out UTI for dysuria symptoms. Estrogen cream pending providers approval.

## 2024-08-09 ENCOUNTER — LAB (OUTPATIENT)
Dept: LAB | Facility: LAB | Age: 79
End: 2024-08-09
Payer: MEDICARE

## 2024-08-09 DIAGNOSIS — R30.0 DYSURIA: ICD-10-CM

## 2024-08-09 PROCEDURE — 87086 URINE CULTURE/COLONY COUNT: CPT

## 2024-08-10 LAB — BACTERIA UR CULT: NO GROWTH

## 2024-08-13 ENCOUNTER — TELEPHONE (OUTPATIENT)
Dept: UROLOGY | Facility: CLINIC | Age: 79
End: 2024-08-13
Payer: MEDICARE

## 2024-08-13 NOTE — TELEPHONE ENCOUNTER
Patient concerned her urine is still cloudy after negative urine culture results. PCR kit will be sent to patient's home pending insurance approval.

## 2024-08-26 NOTE — TELEPHONE ENCOUNTER
Patient notified of positive PCR results. Macrobid BID for 7 days pended to patient's preferred pharmacy

## 2024-08-26 NOTE — PROGRESS NOTES
CHIEF COMPLAINT: Bladder symptoms       HISTORY OF PRESENT ILLNESS:    pelvic organ prolapse post hysterectomy   urinary urgency  recurrent UTIs  rectovaginal fistula.     Last seen by myself on 6/26/2024 . This is a  78 y.o. female who presents for follow-up for bladder symptoms.     She reports that ***  She endorses ***  She denies ***    Review of Systems    PHYSICAL EXAMINATION:  No LMP recorded.  There is no height or weight on file to calculate BMI.  Visit Vitals  Smoking Status Never     NP: Constitutional: alert and in no acute distress, well developed, well nourished.   Head and Face: head and face: unremarkable.   Neck: no neck asymmetry, supple.   Pulmonary: no respiratory distress, unremarkable respiratory effort.   Skin: normal skin color and pigmentation, normal skin turgor, and no rash.   Neurologic: cranial nerves: non-focal, grossly intact.   Psychiatric: alert and oriented x 3.     Pelvic:  Sexual Maturity: Normal.   External Genitalia: Unremarkable.   Bladder: ***  Urethra: Hypermobile***. ***CST when sitting.   Vagina:   Cervix: *** Aa: *** Ba: *** C: *** Gh: *** Pb: *** TVL: *** Ap: *** Bp: *** D: ***  Urethra: ***  Pelvic Floor/Tenderness: Highest pain level is *** (0-10).  Rectum: Unremarkable.   Anus: Unremarkable.   Perianal area: Unremarkable.       PVR (by Ultrasound): ***   Urine dip: No results found for this or any previous visit (from the past 6 hour(s)).      IMPRESSION AND PLAN:  Vilma Sagastume is a 78 y.o. who presents with bladder symptoms.     Problem List Items Addressed This Visit    None       It is my impression that ***  I would like her to ***     I asked her to follow up with *** in *** months for re-evaluation or sooner as needed.    All questions and concerns were answered and addressed.  The patient expressed understanding and agrees with the plan.       SIGNATURES  Scribe Attestation  By signing my name below, Iqra NAPIER Scribe   attest that this documentation  has been prepared under the direction and in the presence of Nell Etienne MD.

## 2024-08-27 ENCOUNTER — APPOINTMENT (OUTPATIENT)
Dept: UROLOGY | Facility: CLINIC | Age: 79
End: 2024-08-27
Payer: MEDICARE

## 2024-08-28 DIAGNOSIS — N39.0 URINARY TRACT INFECTION WITHOUT HEMATURIA, SITE UNSPECIFIED: ICD-10-CM

## 2024-08-28 RX ORDER — NITROFURANTOIN 25; 75 MG/1; MG/1
100 CAPSULE ORAL 2 TIMES DAILY
Qty: 14 CAPSULE | Refills: 0 | Status: SHIPPED | OUTPATIENT
Start: 2024-08-28 | End: 2024-09-04

## 2024-09-03 ENCOUNTER — ANESTHESIA EVENT (OUTPATIENT)
Dept: OPERATING ROOM | Facility: CLINIC | Age: 79
End: 2024-09-03
Payer: MEDICARE

## 2024-09-04 ENCOUNTER — ANESTHESIA (OUTPATIENT)
Dept: OPERATING ROOM | Facility: CLINIC | Age: 79
End: 2024-09-04
Payer: MEDICARE

## 2024-09-04 ENCOUNTER — HOSPITAL ENCOUNTER (OUTPATIENT)
Facility: CLINIC | Age: 79
Setting detail: OUTPATIENT SURGERY
Discharge: HOME | End: 2024-09-04
Attending: COLON & RECTAL SURGERY | Admitting: COLON & RECTAL SURGERY
Payer: MEDICARE

## 2024-09-04 VITALS
HEART RATE: 81 BPM | TEMPERATURE: 97.2 F | HEIGHT: 61 IN | BODY MASS INDEX: 31.97 KG/M2 | SYSTOLIC BLOOD PRESSURE: 106 MMHG | DIASTOLIC BLOOD PRESSURE: 59 MMHG | OXYGEN SATURATION: 95 % | WEIGHT: 169.31 LBS | RESPIRATION RATE: 16 BRPM

## 2024-09-04 DIAGNOSIS — N82.4 COLOVAGINAL FISTULA: Primary | ICD-10-CM

## 2024-09-04 PROCEDURE — 3600000006 HC OR TIME - EACH INCREMENTAL 1 MINUTE - PROCEDURE LEVEL ONE: Performed by: COLON & RECTAL SURGERY

## 2024-09-04 PROCEDURE — 3700000001 HC GENERAL ANESTHESIA TIME - INITIAL BASE CHARGE: Performed by: COLON & RECTAL SURGERY

## 2024-09-04 PROCEDURE — 2500000005 HC RX 250 GENERAL PHARMACY W/O HCPCS: Performed by: NURSE ANESTHETIST, CERTIFIED REGISTERED

## 2024-09-04 PROCEDURE — 2500000001 HC RX 250 WO HCPCS SELF ADMINISTERED DRUGS (ALT 637 FOR MEDICARE OP): Performed by: COLON & RECTAL SURGERY

## 2024-09-04 PROCEDURE — 2500000004 HC RX 250 GENERAL PHARMACY W/ HCPCS (ALT 636 FOR OP/ED): Performed by: NURSE ANESTHETIST, CERTIFIED REGISTERED

## 2024-09-04 PROCEDURE — 3600000001 HC OR TIME - INITIAL BASE CHARGE - PROCEDURE LEVEL ONE: Performed by: COLON & RECTAL SURGERY

## 2024-09-04 PROCEDURE — 3700000002 HC GENERAL ANESTHESIA TIME - EACH INCREMENTAL 1 MINUTE: Performed by: COLON & RECTAL SURGERY

## 2024-09-04 PROCEDURE — 7100000009 HC PHASE TWO TIME - INITIAL BASE CHARGE: Performed by: COLON & RECTAL SURGERY

## 2024-09-04 PROCEDURE — 7100000010 HC PHASE TWO TIME - EACH INCREMENTAL 1 MINUTE: Performed by: COLON & RECTAL SURGERY

## 2024-09-04 PROCEDURE — 2500000004 HC RX 250 GENERAL PHARMACY W/ HCPCS (ALT 636 FOR OP/ED): Performed by: COLON & RECTAL SURGERY

## 2024-09-04 RX ORDER — MIDAZOLAM HYDROCHLORIDE 1 MG/ML
INJECTION, SOLUTION INTRAMUSCULAR; INTRAVENOUS AS NEEDED
Status: DISCONTINUED | OUTPATIENT
Start: 2024-09-04 | End: 2024-09-04

## 2024-09-04 RX ORDER — OXYCODONE HYDROCHLORIDE 5 MG/1
5 TABLET ORAL EVERY 4 HOURS PRN
Status: DISCONTINUED | OUTPATIENT
Start: 2024-09-04 | End: 2024-09-04 | Stop reason: HOSPADM

## 2024-09-04 RX ORDER — FENTANYL CITRATE 50 UG/ML
INJECTION, SOLUTION INTRAMUSCULAR; INTRAVENOUS AS NEEDED
Status: DISCONTINUED | OUTPATIENT
Start: 2024-09-04 | End: 2024-09-04

## 2024-09-04 RX ORDER — PROPOFOL 10 MG/ML
INJECTION, EMULSION INTRAVENOUS CONTINUOUS PRN
Status: DISCONTINUED | OUTPATIENT
Start: 2024-09-04 | End: 2024-09-04

## 2024-09-04 RX ORDER — ONDANSETRON HYDROCHLORIDE 2 MG/ML
4 INJECTION, SOLUTION INTRAVENOUS ONCE AS NEEDED
Status: DISCONTINUED | OUTPATIENT
Start: 2024-09-04 | End: 2024-09-04 | Stop reason: HOSPADM

## 2024-09-04 RX ORDER — LIDOCAINE HYDROCHLORIDE 20 MG/ML
INJECTION, SOLUTION INFILTRATION; PERINEURAL AS NEEDED
Status: DISCONTINUED | OUTPATIENT
Start: 2024-09-04 | End: 2024-09-04

## 2024-09-04 RX ORDER — ACETAMINOPHEN 325 MG/1
TABLET ORAL AS NEEDED
Status: DISCONTINUED | OUTPATIENT
Start: 2024-09-04 | End: 2024-09-04

## 2024-09-04 RX ORDER — LIDOCAINE IN NACL,ISO-OSMOT/PF 30 MG/3 ML
0.1 SYRINGE (ML) INJECTION ONCE
Status: DISCONTINUED | OUTPATIENT
Start: 2024-09-04 | End: 2024-09-04 | Stop reason: HOSPADM

## 2024-09-04 RX ORDER — ALBUTEROL SULFATE 0.83 MG/ML
2.5 SOLUTION RESPIRATORY (INHALATION) ONCE AS NEEDED
Status: DISCONTINUED | OUTPATIENT
Start: 2024-09-04 | End: 2024-09-04 | Stop reason: HOSPADM

## 2024-09-04 RX ORDER — SODIUM CHLORIDE, SODIUM LACTATE, POTASSIUM CHLORIDE, CALCIUM CHLORIDE 600; 310; 30; 20 MG/100ML; MG/100ML; MG/100ML; MG/100ML
100 INJECTION, SOLUTION INTRAVENOUS CONTINUOUS
Status: DISCONTINUED | OUTPATIENT
Start: 2024-09-04 | End: 2024-09-04 | Stop reason: HOSPADM

## 2024-09-04 RX ORDER — SODIUM CHLORIDE, SODIUM LACTATE, POTASSIUM CHLORIDE, CALCIUM CHLORIDE 600; 310; 30; 20 MG/100ML; MG/100ML; MG/100ML; MG/100ML
INJECTION, SOLUTION INTRAVENOUS CONTINUOUS PRN
Status: DISCONTINUED | OUTPATIENT
Start: 2024-09-04 | End: 2024-09-04

## 2024-09-04 SDOH — HEALTH STABILITY: MENTAL HEALTH: CURRENT SMOKER: 0

## 2024-09-04 ASSESSMENT — PAIN SCALES - GENERAL
PAINLEVEL_OUTOF10: 0 - NO PAIN

## 2024-09-04 ASSESSMENT — COLUMBIA-SUICIDE SEVERITY RATING SCALE - C-SSRS
2. HAVE YOU ACTUALLY HAD ANY THOUGHTS OF KILLING YOURSELF?: NO
1. IN THE PAST MONTH, HAVE YOU WISHED YOU WERE DEAD OR WISHED YOU COULD GO TO SLEEP AND NOT WAKE UP?: NO
6. HAVE YOU EVER DONE ANYTHING, STARTED TO DO ANYTHING, OR PREPARED TO DO ANYTHING TO END YOUR LIFE?: NO

## 2024-09-04 ASSESSMENT — PAIN - FUNCTIONAL ASSESSMENT
PAIN_FUNCTIONAL_ASSESSMENT: 0-10

## 2024-09-04 NOTE — DISCHARGE INSTRUCTIONS
"Joshua Wright MD  [facility]  Office Phone: [phone]  After Hours: [phone]     POST-OPERATIVE INSTRUCTIONS FOR  AMBULATORY ANORECTAL SURGERY        Wound Care  -Take all the bandages off in the late afternoon or evening and take the first hot bath  -The bath water should be as warm as tolerable, without causing burns  -It is NOT necessary to add anything to the water (such as Epsom salt)  -Hot baths should be used at least 3-4 times each day AND especially after each bowel movement  -The \"packing\" (if present) should be removed from the anus during the first bath  -Expect some oozing or slight bleeding  -No bandages are necessary, but may be used as desired to absorb any drainage: plain gauze or maxi pads    Pain  -The anesthetic that was injected at the time of surgery should last 3-6 hours  -It is normal for the anal area to be very painful for several days, and especially after the initial bowel movements  -HOT BATHS PROVIDE THE BEST PAIN RELIEF, and should be used liberally  -The prescription you have been given is for a strong narcotic pain medication. Take 1-2 tabs every 4-6 hours as needed for pain  -Use the pain medication as necessary, but be aware that it will cause some degree of constipation  -If the pain is less severe, Advil (up to 600 mg every 4 hours) or Tylenol (up to 1000 mg every 4 hours) should be substituted. Do not take Percocet AND Tylenol together at the same time. You may take Percocet and Advil.     Skin Irritation:  -If you have increased drainage after your procedure, this can cause skin irritation which can be uncomfortable.   -To minimize skin irritation keep skin clean and dry by patting area dry or use a hair dryer on cool to eliminate moisture. Use a skin barrier cream around anus such as Aquaphor.   -For excess moisture, place a cotton ball or 4x4 gauze pad on the outside of anus and change as needed. This will wick away moisture from " skin.    Diet  -Eat as much fiber as possible: fruits, vegetables, salads, whole grain breads, bran cereals, bran muffins, prunes and prune juice  -Drink lots of fluids: water and juices  -Avoid spicy foods until the wounds are healed          Bowel Movements  -It is common not to move your bowels for 2 or 3 days after surgery  -It is important to try to avoid becoming constipated  -A high fiber diet is essential  -Take fiber powder such as Metamucil once or twice a day, and/or Colace three times a day  -Excessive pressure in the rectum (or even pain) may indicate the need to have a bowel movement  -If a laxative is required usually try two tablespoons of Milk of Magnesia; if this is not effective, take Miralax  -THE FIRST BOWEL MOVEMENT HURTS!!!    Activity  -You may resume normal activities, including exercising, as soon as you feel up to it  -You can return to work whenever you feel able  -There is no way you can do yourself any harm or affect the success of the surgery by excessive activity    Things to Watch For  -A small amount of bleeding or oozing is normal, especially with bowel movements; if bleeding is heavy, or does not stop after bowel movements, call me immediately.  -It is not unusual to have difficulty urinating after surgery; often it is necessary it urinate while sitting in the hot bath; frequent urination of very small volumes is abnormal and requires that you call me. If you have not urinated at all by the first evening, you must call.    Medications:  Resume your medication(s) as prescribed, unless otherwise directed.      Follow-up Information  -If you do not already have an appointment, call my office within a few days, and make an appointment for a check-up about 6 weeks after your surgery, call: [phone] Option 1.   -If there are questions or problems either I or one of my partners can be reached 24 hours a day, seven days a week. Call the office [phone] Option 2 and the service  will take a message and arrange a call back.      Tylenol taken at 8:15 if needed due at 12:15pm

## 2024-09-04 NOTE — OP NOTE
Exam Under Anesthesia Anus/Rectum Operative Note     Date: 2024  OR [facility]    Name: Vilma Sagastume : 1945, Age: 78 y.o., MRN: [id number], Sex: female    Diagnosis  Pre-op Diagnosis      * Colovaginal fistula [N82.4] Post-op Diagnosis     * Colovaginal fistula [N82.4]     Procedures  Exam Under Anesthesia Anus/Rectum  42372 - DC ANRCT XM SURG REQ ANES GENERAL SPI/EDRL DX      Surgeons      * Joshua Wright - Primary    Resident/Fellow/Other Assistant:  Surgeons and Role:  * No surgeons found with a matching role *    Procedure Summary  Anesthesia: Anesthesia type not filed in the log.  ASA: III  Anesthesia Staff: Anesthesiologist: Andrew Luna MD  CRNA: LOAN Whitney-CRNA  Estimated Blood Loss: 0mL  Intra-op Medications:   Administrations occurring from 0815 to 0900 on 24:   Medication Name Total Dose   surgical lubricant gel 1 Application              Anesthesia Record               Intraprocedure I/O Totals          Intake    Propofol Drip 0.00 mL    The total shown is the total volume documented since Anesthesia Start was filed.    Total Intake 0 mL          Specimen: No specimens collected     Staff:   Circulator: Jennifer Wallace Person: Lana      Findings: No obvious rectovaginal fistula noted.  Within the vaginal wall on the midline extending to the left lateral corner there was polypoid type growth with what appears to be granulation tissue.  There is no obvious connection to the rectum.    Indications: Vilma Sagastume is an 78 y.o. female who is having surgery for Colovaginal fistula [N82.4].     The patient was seen in the preoperative area. The risks, benefits, complications, treatment options, non-operative alternatives, expected recovery and outcomes were discussed with the patient. The possibilities of reaction to medication, pulmonary aspiration, injury to surrounding structures, bleeding, recurrent infection, the need for additional procedures,  failure to diagnose a condition, and creating a complication requiring transfusion or operation were discussed with the patient. The patient concurred with the proposed plan, giving informed consent.  The site of surgery was properly noted/marked if necessary per policy. The patient has been actively warmed in preoperative area. Preoperative antibiotics are not indicated. Venous thrombosis prophylaxis are not indicated.    Procedure Details: Patient was identified and brought into the operating room.  She was placed supine on the operating table and anesthesia was induced.  Legs were placed in candycane stirrups.  Examination of the perineum revealed internal organ prolapse completely obliterating the vaginal canal.  There was no feces no pus noted within the vagina on initial inspection.  Speculum was then placed transvaginally and the prolapse was displaced cephalad.  There is no evidence of pus or an obvious opening.  There was some granulation tissue noted in a straight line across the midline about 2 to 3 cm into the vaginal canal.  This extend to the left lateral and traverse the left lateral corner in a cephalad fashion.  A bimanual exam was then performed there was no defect that was identified.  Hill-Tan retractor was then placed transanally again no obvious opening was noted although there was evidence of some puckering in the midline just above the anorectal junction.  I then placed methylene blue soaked gauze within the rectum and allowed it to settle for several minutes.  There was no evidence of extravasation into the vaginal canal.  Complications:  None; patient tolerated the procedure well.    Disposition: PACU - hemodynamically stable.  Condition: stable     Additional Details: n/a    Attending Attestation: I was present and scrubbed for the entire procedure.    Joshua Wright  Phone Number: [phone]

## 2024-09-04 NOTE — ANESTHESIA PREPROCEDURE EVALUATION
Patient: Vilma Sagastume    Procedure Information       Date/Time: 09/04/24 0815    Procedure: Exam Under Anesthesia Anus/Rectum    [facility] [facility]    Surgeons: Joshua Wright MD            Relevant Problems   No relevant active problems       Clinical information reviewed: stable pulmonary-poor exercise tolerance   Tobacco  Allergies  Meds   Med Hx  Surg Hx   Fam Hx  Soc Hx        NPO Detail:  NPO/Void Status  Date of Last Liquid: 09/03/24  Time of Last Liquid: 2200  Date of Last Solid: 09/03/24  Time of Last Solid: 2200         Physical Exam    Airway  Mallampati: II  Neck ROM: full     Cardiovascular   Rhythm: regular  Rate: normal     Dental   (+) upper dentures, lower dentures     Pulmonary   Breath sounds clear to auscultation     Abdominal        Anesthesia Plan    History of general anesthesia?: yes  History of complications of general anesthesia?: no    ASA 3     MAC   (CEFOXITAN = Anaphylaxis)  The patient is not a current smoker.    intravenous induction   Anesthetic plan and risks discussed with patient.    Plan discussed with CRNA.

## 2024-09-04 NOTE — H&P
"History Of Present Illness  Vilma Sagastume is a 78 y.o. female presenting with a history of a rectovaginal fistula. She presents today for evaluation of this issue. No fevers or chills     Past Medical History  Past Medical History:   Diagnosis Date    Arthritis     COPD (chronic obstructive pulmonary disease) (Multi)     Depression     Hyperlipidemia     Hypertension     Prolapse of female pelvic organs        Surgical History  Past Surgical History:   Procedure Laterality Date    HYSTERECTOMY          Social History  She reports that she has never smoked. She has never used smokeless tobacco. She reports current alcohol use. She reports that she does not use drugs.    Family History  No family history on file.     Allergies  Cefoxitin, Nickel, Penicillins, and Sulfa (sulfonamide antibiotics)    Review of Systems     Physical Exam  Constitutional:       Appearance: Normal appearance.   Cardiovascular:      Rate and Rhythm: Normal rate.   Pulmonary:      Effort: Pulmonary effort is normal.   Abdominal:      General: Abdomen is flat.   Neurological:      General: No focal deficit present.      Mental Status: She is alert and oriented to person, place, and time.   Psychiatric:         Mood and Affect: Mood normal.         Behavior: Behavior normal.          Last Recorded Vitals  Blood pressure 122/61, pulse 87, temperature 36.1 °C (97 °F), temperature source Temporal, resp. rate 16, height 1.549 m (5' 1\"), weight 76.8 kg (169 lb 5 oz), SpO2 96%.    Relevant Results             Assessment/Plan   Assessment & Plan  Colovaginal fistula      - EUA       I spent 10 minutes in the professional and overall care of this patient.      Joshua Wright MD    "

## 2024-09-04 NOTE — ANESTHESIA POSTPROCEDURE EVALUATION
Patient: Vilma Sagastume    Procedure Summary       Date: 09/04/24 Room / Location: [facility] OR 03 / [facility]    Anesthesia Start: 0850 Anesthesia Stop: 0923    Procedure: Exam Under Anesthesia Anus/Rectum (Anus) Diagnosis:       Colovaginal fistula      (Colovaginal fistula [N82.4])    Surgeons: Joshua Wright MD Responsible Provider: Andrew Luna MD    Anesthesia Type: MAC ASA Status: 3            Anesthesia Type: MAC    Vitals Value Taken Time   /50 09/04/24 0926   Temp 36.3 09/04/24 0926   Pulse 82 09/04/24 0926   Resp 14 09/04/24 0926   SpO2 94 09/04/24 0926       Anesthesia Post Evaluation    Patient location during evaluation: PACU  Patient participation: complete - patient participated  Level of consciousness: awake  Pain management: satisfactory to patient  Airway patency: patent  Cardiovascular status: acceptable  Respiratory status: acceptable  Hydration status: acceptable  Postoperative Nausea and Vomiting: none  Comments: Stable in PACU      No notable events documented.

## 2024-09-07 ENCOUNTER — TELEPHONE (OUTPATIENT)
Dept: UROLOGY | Facility: HOSPITAL | Age: 79
End: 2024-09-07
Payer: MEDICARE

## 2024-09-07 NOTE — LETTER
September 7, 2024     Vilma Sagastume    Patient: Vilma Sagastume   YOB: 1945   Date of Visit: 9/7/2024       Dear Dr. Vilma Sagastume:    Thank you for referring Vilma Sagastume to me for evaluation. Below are my notes for this consultation.  If you have questions, please do not hesitate to call me. I look forward to following your patient along with you.       Sincerely,     Bethel Rivers MD      CC: No Recipients  ______________________________________________________________________________________    I had a phone call with the patient today to discuss the surgical options for the management of her post hysterectomy pelvic organ prolapse.  Patient did undergo exam under anesthesia by Dr. Wright that revealed no evidence of fistula.  It seems that the pessary induced rectovaginal fistula has healed.  Today we discussed the options for management of the pelvic organ prolapse.  We talked about colpocleisis as well as sacrospinous ligament fixation and anterior repair with perineoplasty.  I explained to the patient the difference between the obliterative procedure as well as the reconstructive procedures.  Patient and requested printed material on both surgical options that I will have my office send.  She is going to read over the material and then schedule a telehealth visit with me to decide on surgery.

## 2024-09-07 NOTE — TELEPHONE ENCOUNTER
I had a phone call with the patient today to discuss the surgical options for the management of her post hysterectomy pelvic organ prolapse.  Patient did undergo exam under anesthesia by Dr. Wright that revealed no evidence of fistula.  It seems that the pessary induced rectovaginal fistula has healed.  Today we discussed the options for management of the pelvic organ prolapse.  We talked about colpocleisis as well as sacrospinous ligament fixation and anterior repair with perineoplasty.  I explained to the patient the difference between the obliterative procedure as well as the reconstructive procedures.  Patient requested printed material on both surgical options that I will have my office send.  She is going to read over the material and then schedule a telehealth visit with me to decide on surgery.

## 2024-09-11 ENCOUNTER — TELEPHONE (OUTPATIENT)
Dept: UROLOGY | Facility: CLINIC | Age: 79
End: 2024-09-11
Payer: MEDICARE

## 2024-09-11 ENCOUNTER — PATIENT MESSAGE (OUTPATIENT)
Dept: UROLOGY | Facility: CLINIC | Age: 79
End: 2024-09-11
Payer: MEDICARE

## 2024-09-11 DIAGNOSIS — N39.0 URINARY TRACT INFECTION WITHOUT HEMATURIA, SITE UNSPECIFIED: ICD-10-CM

## 2024-09-11 NOTE — TELEPHONE ENCOUNTER
Attempted to contact patient regarding her returned uti symptoms after treatment with Macrobid for positive UTI PCR results. Treatment was complete on 9/4/24. Unable to reach patient regarding giving a repeat urine culture for further testing.

## 2024-09-19 ENCOUNTER — OFFICE VISIT (OUTPATIENT)
Dept: UROLOGY | Facility: CLINIC | Age: 79
End: 2024-09-19
Payer: MEDICARE

## 2024-09-19 DIAGNOSIS — R39.89 OTHER SYMPTOMS AND SIGNS INVOLVING THE GENITOURINARY SYSTEM: ICD-10-CM

## 2024-09-19 DIAGNOSIS — N39.0 RECURRENT UTI: ICD-10-CM

## 2024-09-19 PROCEDURE — 99214 OFFICE O/P EST MOD 30 MIN: CPT | Performed by: UROLOGY

## 2024-09-19 PROCEDURE — G2211 COMPLEX E/M VISIT ADD ON: HCPCS | Performed by: UROLOGY

## 2024-09-19 NOTE — PROGRESS NOTES
HISTORY OF PRESENT ILLNESS:  This is a 78 y.o. female who presents for follow-up for Carlita. She was last seen by our office on 6/13/24 for pelvic organ prolapse, stool coming from the vagina, and rUTIs.   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      PHYSICAL EXAMINATION:  No LMP recorded.  There is no height or weight on file to calculate BMI.  Visit Vitals  Smoking Status Never     General Appearance: well appearing  Neuro: Alert and oriented     Pelvic:  Genitourinary: normal external genitalia, Bartholin's glands negative, [place]'s glands negative  Urethra: normal meatus, non-tender, no periurethral mass  Vaginal mucosa: normal  Cervix: normal  Uterus: normal size, nontender  Adnexae: negative nontender, no masses    Rectal: visibly concentric squeeze, no remarkable notes  PVR: 75 mL    IMPRESSION AND PLAN:  Vilma Sagastume is a 78 y.o. 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 Fistula is healed per the EUA with Dr. Wright.. She has questions regarding her options for treatment.    We also discussed the sacrospinous as an option for treatment as well as the colpocleisis and perineoplasty and sacrospinous we discussed our last visit. We would prefer her to stay away from mesh.     We believe that when this prolapse is alleviated, KISHA symptoms may return. We discussed Bulkamid injections and mid-urethral sling with the patient. The risk and benefit of Bulkamid as well as mid-urethral sling were explained to the patient. We discussed the success rate of mid-urethral sling at the 80 to 90%. We talked about the risk of urinary  retention at 3%, overactive bladder at 6% and mesh related complication at 1%.  We discussed the success rate of Bulkamid at 60% improved. We talked about the pubovaginal sling with rectus fascia. Patient was given information about the surgical management.     The patient seems to be leaning towards colpocleisis surgery but is still very hesitant. We assured her that reconstructive vaginal surgery is always an option unless she chooses to close her vagina.     Follow up when she decides on a treatment plan.    Scribe Attestation  By signing my name below, Di NAPIER Scribe   attest that this documentation has been prepared under the direction and in the presence of Bethel Rivers MD.

## 2024-09-21 ENCOUNTER — LAB (OUTPATIENT)
Dept: LAB | Facility: LAB | Age: 79
End: 2024-09-21
Payer: MEDICARE

## 2024-09-21 DIAGNOSIS — N39.0 URINARY TRACT INFECTION WITHOUT HEMATURIA, SITE UNSPECIFIED: ICD-10-CM

## 2024-09-21 LAB
APPEARANCE UR: ABNORMAL
BILIRUB UR STRIP.AUTO-MCNC: NEGATIVE MG/DL
COLOR UR: COLORLESS
GLUCOSE UR STRIP.AUTO-MCNC: NORMAL MG/DL
HOLD SPECIMEN: NORMAL
KETONES UR STRIP.AUTO-MCNC: NEGATIVE MG/DL
LEUKOCYTE ESTERASE UR QL STRIP.AUTO: ABNORMAL
NITRITE UR QL STRIP.AUTO: NEGATIVE
PH UR STRIP.AUTO: 5 [PH]
PROT UR STRIP.AUTO-MCNC: ABNORMAL MG/DL
RBC # UR STRIP.AUTO: ABNORMAL /UL
RBC #/AREA URNS AUTO: >20 /HPF
SP GR UR STRIP.AUTO: 1.01
UROBILINOGEN UR STRIP.AUTO-MCNC: NORMAL MG/DL
WBC #/AREA URNS AUTO: >50 /HPF
WBC CLUMPS #/AREA URNS AUTO: ABNORMAL /HPF
YEAST BUDDING #/AREA UR COMP ASSIST: PRESENT /HPF

## 2024-09-21 PROCEDURE — 81001 URINALYSIS AUTO W/SCOPE: CPT

## 2024-09-21 PROCEDURE — 87086 URINE CULTURE/COLONY COUNT: CPT

## 2024-09-23 ENCOUNTER — PREP FOR PROCEDURE (OUTPATIENT)
Dept: UROLOGY | Facility: HOSPITAL | Age: 79
End: 2024-09-23
Payer: MEDICARE

## 2024-09-23 DIAGNOSIS — N39.41 URGENCY INCONTINENCE: ICD-10-CM

## 2024-09-23 DIAGNOSIS — B37.9 YEAST INFECTION: ICD-10-CM

## 2024-09-23 DIAGNOSIS — N39.41 URGE INCONTINENCE OF URINE: ICD-10-CM

## 2024-09-23 DIAGNOSIS — N99.3 VAGINAL VAULT PROLAPSE AFTER HYSTERECTOMY: Primary | ICD-10-CM

## 2024-09-23 LAB — BACTERIA UR CULT: NORMAL

## 2024-09-23 RX ORDER — CIPROFLOXACIN 2 MG/ML
400 INJECTION, SOLUTION INTRAVENOUS ONCE
OUTPATIENT
Start: 2024-09-23 | End: 2024-09-23

## 2024-09-23 RX ORDER — SODIUM CHLORIDE, SODIUM LACTATE, POTASSIUM CHLORIDE, CALCIUM CHLORIDE 600; 310; 30; 20 MG/100ML; MG/100ML; MG/100ML; MG/100ML
20 INJECTION, SOLUTION INTRAVENOUS CONTINUOUS
OUTPATIENT
Start: 2024-09-23

## 2024-09-23 RX ORDER — FLUCONAZOLE 100 MG/1
TABLET ORAL
Qty: 5 TABLET | Refills: 0 | Status: CANCELLED | OUTPATIENT
Start: 2024-09-23 | End: 2024-09-27

## 2024-09-23 NOTE — TELEPHONE ENCOUNTER
Patient notified of positive urinalysis results. Fluconazole 100mg BID for 1 day then once dsily for 3 days pended to patient preferred pharmacy. Patient states understanding and agrees to plan of care.

## 2024-09-25 ENCOUNTER — HOSPITAL ENCOUNTER (OUTPATIENT)
Dept: INFUSION THERAPY | Age: 79
Setting detail: INFUSION SERIES
Discharge: HOME | End: 2024-09-25
Payer: MEDICARE

## 2024-09-25 VITALS
TEMPERATURE: 97.3 F | BODY MASS INDEX: 36.28 KG/M2 | RESPIRATION RATE: 16 BRPM | SYSTOLIC BLOOD PRESSURE: 135 MMHG | WEIGHT: 192 LBS | HEART RATE: 80 BPM | OXYGEN SATURATION: 97 % | DIASTOLIC BLOOD PRESSURE: 75 MMHG

## 2024-09-25 DIAGNOSIS — N39.0 URINARY TRACT INFECTION WITHOUT HEMATURIA, SITE UNSPECIFIED: ICD-10-CM

## 2024-09-25 DIAGNOSIS — M81.0 SENILE OSTEOPOROSIS: Primary | ICD-10-CM

## 2024-09-25 PROCEDURE — 6360000002 HC RX W HCPCS: Performed by: NURSE PRACTITIONER

## 2024-09-25 PROCEDURE — 96372 THER/PROPH/DIAG INJ SC/IM: CPT

## 2024-09-25 RX ORDER — FLUCONAZOLE 100 MG/1
TABLET ORAL
Qty: 5 TABLET | Refills: 0 | Status: SHIPPED | OUTPATIENT
Start: 2024-09-25

## 2024-09-25 RX ADMIN — DENOSUMAB 60 MG: 60 INJECTION SUBCUTANEOUS at 15:06

## 2024-09-25 ASSESSMENT — PAIN SCALES - GENERAL: PAINLEVEL_OUTOF10: 4

## 2024-09-25 ASSESSMENT — PAIN DESCRIPTION - LOCATION: LOCATION: BACK;OTHER (COMMENT)

## 2024-09-25 ASSESSMENT — PAIN DESCRIPTION - DESCRIPTORS: DESCRIPTORS: ACHING;DISCOMFORT

## 2024-09-25 ASSESSMENT — PAIN DESCRIPTION - ORIENTATION: ORIENTATION: RIGHT;LEFT

## 2024-09-25 ASSESSMENT — PAIN DESCRIPTION - FREQUENCY: FREQUENCY: INTERMITTENT

## 2024-09-25 NOTE — PROGRESS NOTES
Before  PROLIA INJECTION  patient declined any infections, open wounds, or change in medical condition. Tolerated infusion well.  Reviewed therapy plan, offered education material and/or discharge material, reviewed medication information and signs and symptoms  and educated on possible side effects, verbalizes good knowledge of current plan patient verbalizes understanding, and has no signs or symptoms to report at this time. Patient discharged. Patient alert and oriented x3.   No distress noted.   Vital signs stable.   Patient denies any new or worsening pain.  Patient denies any needs.  All questions answered.  Next appointment scheduled.

## 2024-09-26 ENCOUNTER — APPOINTMENT (OUTPATIENT)
Dept: UROLOGY | Facility: CLINIC | Age: 79
End: 2024-09-26
Payer: MEDICARE

## 2024-10-03 ASSESSMENT — LIFESTYLE VARIABLES: SMOKING_STATUS: NONSMOKER

## 2024-10-03 ASSESSMENT — DUKE ACTIVITY SCORE INDEX (DASI)
CAN YOU CLIMB A FLIGHT OF STAIRS OR WALK UP A HILL: YES
CAN YOU DO YARD WORK LIKE RAKING LEAVES, WEEDING OR PUSHING A MOWER: NO
CAN YOU HAVE SEXUAL RELATIONS: NO
CAN YOU TAKE CARE OF YOURSELF (EAT, DRESS, BATHE, OR USE TOILET): YES
CAN YOU PARTICIPATE IN MODERATE RECREATIONAL ACTIVITIES LIKE GOLF, BOWLING, DANCING, DOUBLES TENNIS OR THROWING A BASEBALL OR FOOTBALL: NO
CAN YOU WALK INDOORS, SUCH AS AROUND YOUR HOUSE: YES
CAN YOU WALK A BLOCK OR TWO ON LEVEL GROUND: YES
CAN YOU DO LIGHT WORK AROUND THE HOUSE LIKE DUSTING OR WASHING DISHES: YES
CAN YOU RUN A SHORT DISTANCE: NO
TOTAL_SCORE: 18.95
DASI METS SCORE: 5.1
CAN YOU DO MODERATE WORK AROUND THE HOUSE LIKE VACUUMING, SWEEPING FLOORS OR CARRYING GROCERIES: YES
CAN YOU PARTICIPATE IN STRENOUS SPORTS LIKE SWIMMING, SINGLES TENNIS, FOOTBALL, BASKETBALL, OR SKIING: NO
CAN YOU DO HEAVY WORK AROUND THE HOUSE LIKE SCRUBBING FLOORS OR LIFTING AND MOVING HEAVY FURNITURE: NO

## 2024-10-03 ASSESSMENT — ACTIVITIES OF DAILY LIVING (ADL): ADL_SCORE: 0

## 2024-10-04 ENCOUNTER — PRE-ADMISSION TESTING (OUTPATIENT)
Dept: PREADMISSION TESTING | Facility: HOSPITAL | Age: 79
End: 2024-10-04
Payer: MEDICARE

## 2024-10-04 ENCOUNTER — LAB (OUTPATIENT)
Dept: LAB | Facility: LAB | Age: 79
End: 2024-10-04
Payer: MEDICARE

## 2024-10-04 VITALS
RESPIRATION RATE: 16 BRPM | OXYGEN SATURATION: 96 % | DIASTOLIC BLOOD PRESSURE: 69 MMHG | BODY MASS INDEX: 32.47 KG/M2 | TEMPERATURE: 96.8 F | WEIGHT: 172 LBS | HEART RATE: 80 BPM | HEIGHT: 61 IN | SYSTOLIC BLOOD PRESSURE: 132 MMHG

## 2024-10-04 DIAGNOSIS — N39.41 URGE INCONTINENCE OF URINE: ICD-10-CM

## 2024-10-04 DIAGNOSIS — N39.41 URGENCY INCONTINENCE: ICD-10-CM

## 2024-10-04 DIAGNOSIS — N99.3 VAGINAL VAULT PROLAPSE AFTER HYSTERECTOMY: ICD-10-CM

## 2024-10-04 DIAGNOSIS — Z01.818 PREOP EXAMINATION: Primary | ICD-10-CM

## 2024-10-04 LAB
ANION GAP SERPL CALC-SCNC: 12 MMOL/L (ref 10–20)
APPEARANCE UR: ABNORMAL
BILIRUB UR STRIP.AUTO-MCNC: NEGATIVE MG/DL
BUN SERPL-MCNC: 10 MG/DL (ref 6–23)
CALCIUM SERPL-MCNC: 8.1 MG/DL (ref 8.6–10.3)
CHLORIDE SERPL-SCNC: 109 MMOL/L (ref 98–107)
CO2 SERPL-SCNC: 25 MMOL/L (ref 21–32)
COLOR UR: ABNORMAL
CREAT SERPL-MCNC: 0.95 MG/DL (ref 0.5–1.05)
EGFRCR SERPLBLD CKD-EPI 2021: 61 ML/MIN/1.73M*2
ERYTHROCYTE [DISTWIDTH] IN BLOOD BY AUTOMATED COUNT: 14.7 % (ref 11.5–14.5)
GLUCOSE SERPL-MCNC: 87 MG/DL (ref 74–99)
GLUCOSE UR STRIP.AUTO-MCNC: NORMAL MG/DL
HCT VFR BLD AUTO: 34.3 % (ref 36–46)
HGB BLD-MCNC: 10.8 G/DL (ref 12–16)
KETONES UR STRIP.AUTO-MCNC: NEGATIVE MG/DL
LEUKOCYTE ESTERASE UR QL STRIP.AUTO: ABNORMAL
MCH RBC QN AUTO: 31.2 PG (ref 26–34)
MCHC RBC AUTO-ENTMCNC: 31.5 G/DL (ref 32–36)
MCV RBC AUTO: 99 FL (ref 80–100)
MUCOUS THREADS #/AREA URNS AUTO: ABNORMAL /LPF
NITRITE UR QL STRIP.AUTO: NEGATIVE
NRBC BLD-RTO: 0 /100 WBCS (ref 0–0)
PH UR STRIP.AUTO: 5.5 [PH]
PLATELET # BLD AUTO: 239 X10*3/UL (ref 150–450)
POTASSIUM SERPL-SCNC: 3.8 MMOL/L (ref 3.5–5.3)
PROT UR STRIP.AUTO-MCNC: ABNORMAL MG/DL
RBC # BLD AUTO: 3.46 X10*6/UL (ref 4–5.2)
RBC # UR STRIP.AUTO: NEGATIVE /UL
RBC #/AREA URNS AUTO: ABNORMAL /HPF
SODIUM SERPL-SCNC: 142 MMOL/L (ref 136–145)
SP GR UR STRIP.AUTO: 1.01
SQUAMOUS #/AREA URNS AUTO: ABNORMAL /HPF
UROBILINOGEN UR STRIP.AUTO-MCNC: NORMAL MG/DL
WBC # BLD AUTO: 5.5 X10*3/UL (ref 4.4–11.3)
WBC #/AREA URNS AUTO: >50 /HPF
WBC CLUMPS #/AREA URNS AUTO: ABNORMAL /HPF

## 2024-10-04 PROCEDURE — 85027 COMPLETE CBC AUTOMATED: CPT

## 2024-10-04 PROCEDURE — 87081 CULTURE SCREEN ONLY: CPT | Mod: STJLAB

## 2024-10-04 PROCEDURE — 99202 OFFICE O/P NEW SF 15 MIN: CPT

## 2024-10-04 PROCEDURE — 80048 BASIC METABOLIC PNL TOTAL CA: CPT

## 2024-10-04 PROCEDURE — 87086 URINE CULTURE/COLONY COUNT: CPT

## 2024-10-04 PROCEDURE — 36415 COLL VENOUS BLD VENIPUNCTURE: CPT

## 2024-10-04 PROCEDURE — 93010 ELECTROCARDIOGRAM REPORT: CPT | Performed by: INTERNAL MEDICINE

## 2024-10-04 PROCEDURE — 93005 ELECTROCARDIOGRAM TRACING: CPT

## 2024-10-04 PROCEDURE — 81001 URINALYSIS AUTO W/SCOPE: CPT

## 2024-10-04 RX ORDER — CHLORHEXIDINE GLUCONATE ORAL RINSE 1.2 MG/ML
SOLUTION DENTAL
Qty: 473 ML | Refills: 0 | Status: SHIPPED | OUTPATIENT
Start: 2024-10-04

## 2024-10-04 NOTE — PREPROCEDURE INSTRUCTIONS
Medication List            Accurate as of October 4, 2024  2:13 PM. Always use your most recent med list.                acetaminophen 650 mg ER tablet  Commonly known as: Tylenol 8 HOUR  Medication Adjustments for Surgery: Take/Use as prescribed     allopurinol 300 mg tablet  Commonly known as: Zyloprim  Medication Adjustments for Surgery: Take/Use as prescribed     atorvastatin 40 mg tablet  Commonly known as: Lipitor  Medication Adjustments for Surgery: Take/Use as prescribed     buPROPion  mg 24 hr tablet  Commonly known as: Wellbutrin XL  Medication Adjustments for Surgery: Take/Use as prescribed     calcitriol 0.25 mcg capsule  Commonly known as: Rocaltrol  Additional Medication Adjustments for Surgery: Take last dose 7 days before surgery     carvedilol 12.5 mg tablet  Commonly known as: Coreg  Medication Adjustments for Surgery: Take/Use as prescribed     chlorhexidine 0.12 % solution  Commonly known as: Peridex  15 milliliter(s) orally once a day for 2 doses 15 ml  the night before surgery and 15 ml morning of surgery - swish for 30 seconds -DO NOT SWALLOW, SPIT OUT     estradiol 0.01 % (0.1 mg/gram) vaginal cream  Commonly known as: Estrace  Apply nightly for 3 weeks, then 3 times per week.  Medication Adjustments for Surgery: Take last dose 1 day (24 hours) before surgery  Additional Medication Adjustments for Surgery: Other (Comment)  Notes to patient: Not taking      fluconazole 100 mg tablet  Commonly known as: Diflucan  Take 100mg BID for one day, then daily for three days.  Medication Adjustments for Surgery: Take/Use as prescribed  Additional Medication Adjustments for Surgery: Other (Comment)  Notes to patient: Not taking      losartan 25 mg tablet  Commonly known as: Cozaar  Additional Medication Adjustments for Surgery: Other (Comment)  Notes to patient: HOLD any evening dose the night before the day of surgery  HOLD the day of surgery     Prolia 60 mg/mL syringe  Generic drug:  denosumab  Additional Medication Adjustments for Surgery: Other (Comment)  Notes to patient: Coordinate with prescribing provider for further instructions on this medications prior to surgery.     spironolactone 25 mg tablet  Commonly known as: Aldactone  Medication Adjustments for Surgery: Take/Use as prescribed     Stiolto Respimat 2.5-2.5 mcg/actuation mist inhaler  Generic drug: tiotropium-olodateroL  Medication Adjustments for Surgery: Take/Use as prescribed     torsemide 20 mg tablet  Commonly known as: Demadex  Medication Adjustments for Surgery: Take/Use as prescribed                PRE-OPERATIVE INSTRUCTIONS    You will receive notification one business day prior to your procedure to confirm your arrival time. It is important that you answer your phone and/or check your messages during this time. If you do not hear from the surgery center by 5 pm. the day before your procedure, please call [phone].     Please enter the building through the Outpatient entrance and take the elevator off the lobby to the 2nd floor then check in at the Outpatient Surgery desk on the 2nd floor.    INSTRUCTIONS:  Talk to your surgeon for instructions if you should stop your aspirin, blood thinner, or diabetes medicines.  DO NOT take any multivitamins or over the counter supplements for 7-10 days before surgery.  If not being admitted, you must have an adult immediately available to drive you home after surgery. We also highly recommend you have someone stay with you for the entire day and night of your surgery.  For children having surgery, a parent or legal guardian must accompany them to the surgery center. If this is not possible, please call [phone] to make additional arrangements.  For adults who are unable to consent or make medical decisions for themselves, a legal guardian or Power of  must accompany them to the surgery center. If this is not possible, please call [phone] to make additional  arrangements.  Wear comfortable, loose fitting clothing.  All jewelry and piercings must be removed. If you are unable to remove an item or have a dermal piercing, please be sure to tell the nurse when you arrive for surgery.  Nail polish and make-up must be removed.  Avoid smoking or consuming alcohol for 24 hours before surgery.  To help prevent infection, please take a shower/bath and wash your hair the night before and/or morning of surgery (or follow other specific bathing instructions provided).    Preoperative Fasting Guidelines    Why must I stop eating and drinking near surgery time?  With sedation, food or liquid in your stomach can enter your lungs causing serious complications  Increases nausea and vomiting    When do I need to stop eating and drinking before my surgery?  Do not eat any solid food after midnight the night before your surgery/procedure unless otherwise instructed by your surgeon.   You may have up to 13.5 ounces of clear liquid until TWO hours before your instructed arrival time to the hospital.  This includes water, black tea/coffee, (no milk or cream) apple juice, and electrolyte drinks (Gatorade).   You may chew gum until TWO hours before your surgery/procedure      If applicable, notify your surgeons office immediately of any new skin changes that occur to the surgical limb.      If you have any questions or concerns, please call Pre-Admission Testing at [phone].

## 2024-10-04 NOTE — PREPROCEDURE INSTRUCTIONS
Thank you for visiting Preadmission Testing at [facility]. If you have any changes to your health condition, please call the SURGEON's office to alert them and give them details of your symptoms.        Preoperative Brain Exercises    What are brain exercises?  A brain exercise is any activity that engages your thinking (cognitive) skills.    What types of activities are considered brain exercises?  Jigsaw puzzles, crossword puzzles, word jumble, memory games, word search, and many more.  Many can be found free online or on your phone via a mobile maykel.    Why should I do brain exercises before my surgery?  More recent research has shown brain exercise before surgery can lower the risk of postoperative delirium (confusion) which can be especially important for older adults.  Patients who did brain exercises for 5 to 10 hours the days before surgery, cut their risk of postoperative delirium in half up to 1 week after surgery.      Preoperative Deep Breathing Exercises    Why it is important to do deep breathing exercises before my surgery?  Deep breathing exercises strengthen your breathing muscles.  This helps you to recover after your surgery and decreases the chance of breathing complications.    How are the deep breathing exercises done?  Sit straight with your back supported.  Breathe in deeply and slowly through your nose. Your lower rib cage should expand and your abdomen may move forward.  Hold that breath for 3 to 5 seconds.  Breathe out through pursed lips, slowly and completely.  Rest and repeat 10 times every hour while awake.  Rest longer if you become dizzy or lightheaded.      Patient and Family Education   Ways You Can Help Prevent Blood Clots     This handout explains some simple things you can do to help prevent blood clots.      Blood clots are blockages that can form in the body's veins. When a blood clot forms in your deep veins, it may be called a deep vein thrombosis, or DVT for short. Blood clots can  happen in any part of the body where blood flows, but they are most common in the arms and legs. If a piece of a blood clot breaks free and travels to the lungs, it is called a pulmonary embolus (PE). A PE can be a very serious problem.      Being in the hospital or having surgery can raise your chances of getting a blood clot because you may not be well enough to move around as much as you normally do.      Ways you can help prevent blood clots in the hospital         Wearing SCDs. SCDs stands for Sequential Compression Devices.   SCDs are special sleeves that wrap around your legs  They attach to a pump that fills them with air to gently squeeze your legs every few minutes.   This helps return the blood in your legs to your heart.   SCDs should only be taken off when walking or bathing.   SCDs may not be comfortable, but they can help save your life.               Wearing compression stockings - if your doctor orders them. These special snug fitting stockings gently squeeze your legs to help blood flow.       Walking. Walking helps move the blood in your legs.   If your doctor says it is ok, try walking the halls at least   5 times a day. Ask us to help you get up, so you don't fall.      Taking any blood thinning medicines your doctor orders.          ©[facility]; 3/23        Ways you can help prevent blood clots at home       Wearing compression stockings - if your doctor orders them. ? Walking - to help move the blood in your legs.       Taking any blood thinning medicines your doctor orders.      Signs of a blood clot or PE      Tell your doctor or nurse know right away if you have of the problems listed below.    If you are at home, seek medical care right away. Call 911 for chest pain or problems breathing.          Signs of a blood clot (DVT) - such as pain,  swelling, redness or warmth in your arm or leg      Signs of a pulmonary embolism (PE) - such as chest     pain or feeling short of breath

## 2024-10-04 NOTE — CPM/PAT H&P
CPM/PAT Evaluation       Name: Vilma Sagastume (Vilma Sagastume)  /Age: 1945/78 y.o.     In-Person       Chief Complaint: Vaginal prolapse     HPI  Pleasant 78-year-old female presents with vaginal vault prolapse after hysterectomy scheduled for vaginal suspension/fixation sacrospinous, colporrhaphy anterior and posterior vaginal wall, suspension with urinary sling on 10/15/2024.  She has had this prolapse since she had her hysterectomy.  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  States she has been having frequent UTIs, has urinary burning is her main symptom.  Does have a pessary. She does not actively have urinary burning.  Endorses nocturia.  Denies other urinary symptoms.  Denies recent fever/chills.    Past Medical History:   Diagnosis Date    Arthritis     Cardiomyopathy     CHF (congestive heart failure)     COPD (chronic obstructive pulmonary disease) (Multi)     Depression     Hyperlipidemia     Hypertension     Prolapse of female pelvic organs     Pulmonary embolism     Sleep apnea        Past Surgical History:   Procedure Laterality Date    APPENDECTOMY      BLADDER REPAIR      DILATION AND CURETTAGE OF UTERUS      HERNIA REPAIR      HYSTERECTOMY      KNEE ARTHROPLASTY      VEIN LIGATION AND STRIPPING         Patient  has no history on file for sexual activity.    Family History   Problem Relation Name Age of Onset    Breast cancer Mother      Cardiomyopathy Father         Allergies   Allergen Reactions    Cefoxitin Anaphylaxis    Nickel Rash    Penicillins Hives    Sulfa (Sulfonamide Antibiotics) Rash    Sulfur Dioxide        Prior to Admission medications    Medication Sig Start Date End Date Taking? Authorizing Provider   acetaminophen (Tylenol 8 HOUR) 650 mg ER tablet Take 1 tablet (650 mg) by mouth every 8 hours if needed for mild pain (1 - 3). Do not crush, chew, or split.    Historical Provider, MD   allopurinol (Zyloprim) 300 mg tablet Take 1 tablet (300 mg) by mouth once  daily.    Historical Provider, MD   atorvastatin (Lipitor) 40 mg tablet Take 1 tablet (40 mg) by mouth once daily. 4/2/24   Historical Provider, MD   buPROPion XL (Wellbutrin XL) 300 mg 24 hr tablet Take 1 tablet (300 mg) by mouth once daily in the morning.    Historical Provider, MD   calcitriol (Rocaltrol) 0.25 mcg capsule Take 1 capsule (0.25 mcg) by mouth once daily. 5/6/24   Historical Provider, MD   carvedilol (Coreg) 12.5 mg tablet Take 1 tablet (12.5 mg) by mouth 2 times a day.    Historical Provider, MD   cholecalciferol (Vitamin D-3) 25 MCG (1000 UT) tablet Take 1 tablet (1,000 Units) by mouth once daily.    Historical Provider, MD   denosumab (Prolia) 60 mg/mL syringe Inject 1 mL (60 mg) under the skin.    Historical Provider, MD   estradiol (Estrace) 0.01 % (0.1 mg/gram) vaginal cream Apply nightly for 3 weeks, then 3 times per week. 8/8/24 8/8/25  Bethel Rivers MD   fluconazole (Diflucan) 100 mg tablet Take 100mg BID for one day, then daily for three days. 9/25/24   Bethel Rivers MD   loperamide (Imodium A-D) 2 mg tablet Take 1 tablet (2 mg) by mouth 4 times a day as needed for diarrhea.    Historical Provider, MD   losartan (Cozaar) 25 mg tablet Take 1 tablet (25 mg) by mouth once daily.    Historical Provider, MD   phenazopyridine (Urinary Pain Relief) 95 mg tablet Take 1 tablet (95 mg) by mouth 3 times a day as needed for bladder spasms.    Historical Provider, MD   spironolactone (Aldactone) 25 mg tablet Take 1 tablet (25 mg) by mouth 1 time.    Historical Provider, MD   tiotropium-olodateroL (Stiolto Respimat) 2.5-2.5 mcg/actuation mist inhaler Inhale 2 Inhalations once daily.    Historical Provider, MD   torsemide (Demadex) 20 mg tablet Take 1 tablet (20 mg) by mouth once daily.    Historical Provider, MD        Constitutional: Negative for fever, chills, or sweats   ENMT: Negative for nasal discharge, congestion, ear pain, mouth pain, throat pain. Positive for reading glasses.  Positive for  slight hearing loss in her left ear.  Positive for complete dentures.  Respiratory: Negative for cough, wheezing, shortness of breath   Cardiac: Negative for chest pain, dyspnea on exertion, palpitations   Gastrointestinal: Negative for nausea, vomiting, diarrhea, constipation, abdominal pain.  Positive for intermittent constipation/diarrhea.  Genitourinary: Negative for dysuria, flank pain, frequency, hematuria   Musculoskeletal: Negative for decreased ROM, pain, swelling, weakness   Neurological: Negative for dizziness, confusion, headache.  Positive for forgetfulness.  Psychiatric: Negative for mood changes   Skin: Negative for itching, annie noncompliant h, ulcer.  Positive for chronic discoloration of bilateral lower extremities.    Hematologic/Lymph: Negative for bruising, easy bleeding  Allergic/Immunologic: Negative itching, sneezing, swelling      Physical Exam  Vitals reviewed.   Constitutional:       Appearance: Normal appearance.   HENT:      Head: Normocephalic.      Mouth/Throat:      Mouth: Mucous membranes are moist.      Pharynx: Oropharynx is clear.   Eyes:      Pupils: Pupils are equal, round, and reactive to light.   Cardiovascular:      Rate and Rhythm: Normal rate and regular rhythm.      Heart sounds: Normal heart sounds.   Pulmonary:      Effort: Pulmonary effort is normal.      Breath sounds: Normal breath sounds.   Abdominal:      General: Bowel sounds are normal.      Palpations: Abdomen is soft.   Musculoskeletal:         General: Normal range of motion.      Cervical back: Normal range of motion.      Right lower le+ Pitting Edema present.      Left lower le+ Pitting Edema present.   Skin:     General: Skin is warm and dry.      Comments: Discoloration present bilateral lower extremities   Neurological:      General: No focal deficit present.      Mental Status: She is alert and oriented to person, place, and time.   Psychiatric:         Mood and Affect: Mood normal.          Behavior: Behavior normal.          PAT AIRWAY:   Airway:     Mallampati::  II    Neck ROM::  Full  normal     upper dentures and lower dentures      Visit Vitals  /69   Pulse 80   Temp 36 °C (96.8 °F) (Temporal)   Resp 16       DASI Risk Score      Flowsheet Row [place] [facility] [place]   Can you take care of yourself (eat, dress, bathe, or use toilet)?  2.75 filed at 10/03/2024 1027   Can you walk indoors, such as around your house? 1.75 filed at 10/03/2024 1027   Can you walk a block or two on level ground?  2.75 filed at 10/03/2024 1027   Can you climb a flight of stairs or walk up a hill? 5.5 filed at 10/03/2024 1027   Can you run a short distance? 0 filed at 10/03/2024 1027   Can you do light work around the house like dusting or washing dishes? 2.7 filed at 10/03/2024 1027   Can you do moderate work around the house like vacuuming, sweeping floors or carrying groceries? 3.5 filed at 10/03/2024 1027   Can you do heavy work around the house like scrubbing floors or lifting and moving heavy furniture?  0 filed at 10/03/2024 1027   Can you do yard work like raking leaves, weeding or pushing a mower? 0 filed at 10/03/2024 1027   Can you have sexual relations? 0 filed at 10/03/2024 1027   Can you participate in moderate recreational activities like golf, bowling, dancing, doubles tennis or throwing a baseball or football? 0 filed at 10/03/2024 1027   Can you participate in strenous sports like swimming, singles tennis, football, basketball, or skiing? 0 filed at 10/03/2024 1027   DASI SCORE 18.95 filed at 10/03/2024 1027   METS Score (Will be calculated only when all the questions are answered) 5.1 filed at 10/03/2024 1027          Caprini DVT Assessment      Flowsheet Row Pre-[place] [facility] [place]   DVT Score 13 filed at 10/03/2024 1026   Medical Factors COPD, History of DVT/PE filed at 10/03/2024 1026   Surgical Factors Major  surgery planned, lasting 2-3 hours filed at 10/03/2024 1026   BMI 31-40 (Obesity) filed at 10/03/2024 1026          Modified Frailty Index      Flowsheet Row Pre-Admission Testing from 10/4/2024 in [facility]   Non-independent functional status (problems with dressing, bathing, personal grooming, or cooking) 0 filed at 10/03/2024 1028   History of diabetes mellitus  0 filed at 10/03/2024 1028   History of COPD 0.0909 filed at 10/03/2024 1028   History of CHF No filed at 10/03/2024 1028   History of MI 0 filed at 10/03/2024 1028   History of Percutaneous Coronary Intervention, Cardiac Surgery, or Angina No filed at 10/03/2024 1028   Hypertension requiring the use of medication  0.0909 filed at 10/03/2024 1028   Peripheral vascular disease 0 filed at 10/03/2024 1028   Impaired sensorium (cognitive impairement or loss, clouding, or delirium) 0.0909 filed at 10/03/2024 1028   TIA or CVA withouy residual deficit 0 filed at 10/03/2024 1028   Cerebrovascular accident with deficit 0 filed at 10/03/2024 1028   Modified Frailty Index Calculator .2727 filed at 10/03/2024 1028          CHADS2 Stroke Risk  Current as of 23 minutes ago        N/A 3 to 100%: High Risk   2 to < 3%: Medium Risk   0 to < 2%: Low Risk     Last Change: N/A          This score determines the patient's risk of having a stroke if the patient has atrial fibrillation.        This score is not applicable to this patient. Components are not calculated.          Revised Cardiac Risk Index      Flowsheet Row Pre-Admission Testing from 10/4/2024 in [facility]   High-Risk Surgery (Intraperitoneal, Intrathoracic,Suprainguinal vascular) 0 filed at 10/03/2024 1028   History of ischemic heart disease (History of MI, History of positive exercuse test, Current chest paint considered due to myocardial ischemia, Use of nitrate therapy, ECG with pathological Q Waves) 0 filed at 10/03/2024 1028   History of congestive heart failure  (pulmonary edemia, bilateral rales or S3 gallop, Paroxysmal nocturnal dyspnea, CXR showing pulmonary vascular redistribution) 0 filed at 10/03/2024 1028   History of cerebrovascular disease (Prior TIA or stroke) 0 filed at 10/03/2024 1028   Pre-operative insulin treatment 0 filed at 10/03/2024 1028   Pre-operative creatinine>2 mg/dl 0 filed at 10/03/2024 1028   Revised Cardiac Risk Calculator 0 filed at 10/03/2024 1028          Apfel Simplified Score      Flowsheet Row Pre-Admission Testing from 10/4/2024 in [facility]   Smoking status 1 filed at 10/03/2024 1028   History of motion sickness or PONV  0 filed at 10/03/2024 1028   Use of postoperative opioids 1 filed at 10/03/2024 1028   Gender - Female 1=Yes filed at 10/03/2024 1028   Apfel Simplified Score Calculator 3 filed at 10/03/2024 1028          Risk Analysis Index Results This Encounter         10/3/2024  1028             Do you live in a place other than your own home?: 0    When did you begin living in the place you are currently residing?: Greater than one year ago    Any kidney failure, kidney not working well, or seeing a kidney doctor (nephrologist)? If yes, was this for kidney stones or another problem?: 0 No    Any history of chronic (long-term) congestive heart failure (CHF)?: 0 No    Any shortness of breath when resting?: 0 No    In the past five years, have you been diagnosed with or treated for cancer?: No    During the last 3 months has it become difficult for you to remember things or organize your thoughts?: 1 Yes    Have you lost weight of 10 pounds or more in the past 3 months without trying?: 0 No    Do you have any loss of appetitie?: 0 No    Getting Around (Mobility): 0 Can get around without help    Eatin Can plan and prepare own meals    Toiletin Can use toilet without any help    Personal Hygiene (Bathing, Hand Washing, Changing Clothes): 0 Can shower or bathe without any help    GILL Cancer History: Patient does  not indicate history of cancer    Total Risk Analysis Index Score Without Cancer: 29    Total Risk Analysis Index Score: 29          Stop Bang Score      Flowsheet Row Pre-Admission Testing from 10/4/2024 in [facility]   Do you snore loudly? 0 filed at 10/03/2024 1027   Do you often feel tired or fatigued after your sleep? 1 filed at 10/03/2024 1027   Has anyone ever observed you stop breathing in your sleep? 0 filed at 10/03/2024 1027   Do you have or are you being treated for high blood pressure? 1 filed at 10/03/2024 1027   Recent BMI (Calculated) 32 filed at 10/03/2024 1027   Is BMI greater than 35 kg/m2? 0=No filed at 10/03/2024 1027   Age older than 50 years old? 1=Yes filed at 10/03/2024 1027   Is your neck circumference greater than 17 inches (Male) or 16 inches (Female)? 0 filed at 10/03/2024 1027   Gender - Male 0=No filed at 10/03/2024 1027   STOP-BANG Total Score 3 filed at 10/03/2024 1027            Assessment and Plan:     Assessment and Plan:     Preop:   OR with Dr. Rivers for vaginal suspension/fixation sacrospinous colporrhaphy anterior and posterior vaginal wall, suspension with urinary sling on 10/15/2024  Labs ordered per Dr. Rivers.   EKG obtained and enclosed.  Normal sinus rhythm.  Nonspecific intraventricular block.  Requested prior EKG for comparison.  Patient does have known left bundle branch block. EKG comparable. Slight change in lead V2 noted.     Neurologic:   The patient is at an increased risk for post operative delirium secondary to age >/=65 , polypharmacy , and type and duration of surgery . Preoperative brain exercise educational handout provided to patient.  The patient is at an increased risk for perioperative stroke secondary to cardiac disease, increased age, HTN, HLD, female sex , and general anesthesia.    Cardiac:  Hypertension: Controlled with medical management   Hyperlipidemia: On statin  CHF: Patient follows with cardiology annually.  Has chronic lower  "extremity edema.  Takes torsemide.  No acute changes. No new echocardiogram ordered.  Last echo 6/22/2020: \"Conclusions    Summary    Normal left ventricular size.    Low normal LV systolic function.    Ejection fraction is visually estimated at 50-55%.    Indeterminate diastolic function.    No regional wall motion abnormalities seen.    Normal left ventricular wall thickness.    Abnormal LV septal motion consistent with conduction disorder.    Mildly enlarged right ventricle cavity.    Right ventricle global systolic function is normal.    The left atrium is mild dilated.    Mildly enlarged right atrium.    Mild aortic valve regurgitation.    RVSP is 28 mmHg. \"   Chronic left bundle branch block: Has had since 2003  Follows with cardiology-Dr. Denise.  Last seen 7/2/24  [facility] Activity Status Index (DASI)  DASI Score: 18.95   MET Score: 5.1  RCRI 0, 3.9% risk for postoperative MACE    Pulmonary:   COPD: On stiolto respimat.  Denies recent acute exacerbations. Follows with pulmonology-Dr. Rolon-last seen 3/4/2024. CXR completed on 4/3/24: \"FINDINGS:   The lungs are without acute focal process.  There is no effusion or   pneumothorax. The cardiomediastinal silhouette is without acute process. The   osseous structures are without acute process.  Degenerative and kyphotic   changes thoracic spine.  Hiatal hernia.   IMPRESSION:   1. No acute process.   2. Hiatal hernia. \"  Sleep apnea: Wears BiPAP however she has not been wearing for many months due to her nocturia.  She would like to start wearing BiPAP again after the surgery.  Pulmonary embolism: Occurred prior to 2010.  Has never had any other blood clots.  Was on blood thinners for a short period of time.  STOP-BANG score of 3.  Intermittent risk of obstructive sleep apnea.  Patient does have diagnosed sleep apnea.    /Renal:   Vaginal prolapse: Reason for upcoming surgery.    Neuro-muscular:   Osteopenia: On Prolia.  Osteoarthritis: No acute concerns at this " time.    Psychiatric:   Depression: On Wellbutrin    Hematologic:   Anemia: Stable at this time per patient.  Caprini score 13, patient at high risk for perioperative DVT. Patient provided with VTE education/handout.     Skin check: Patient was instructed to make surgeon aware of any skin changes/concerns prior to surgery.     Anesthesia: No history of anesthesia complications. No anesthesia concerns.      *See risk scores as previously documented

## 2024-10-05 LAB
ATRIAL RATE: 71 BPM
HOLD SPECIMEN: NORMAL
P AXIS: 101 DEGREES
P OFFSET: 124 MS
P ONSET: 104 MS
PR INTERVAL: 184 MS
Q ONSET: 184 MS
QRS COUNT: 12 BEATS
QRS DURATION: 132 MS
QT INTERVAL: 464 MS
QTC CALCULATION(BAZETT): 504 MS
QTC FREDERICIA: 491 MS
R AXIS: -18 DEGREES
T AXIS: 86 DEGREES
T OFFSET: 416 MS
VENTRICULAR RATE: 71 BPM

## 2024-10-06 LAB
BACTERIA UR CULT: NO GROWTH
STAPHYLOCOCCUS SPEC CULT: NORMAL

## 2024-10-07 ENCOUNTER — ANESTHESIA EVENT (OUTPATIENT)
Dept: OPERATING ROOM | Facility: HOSPITAL | Age: 79
End: 2024-10-07
Payer: MEDICARE

## 2024-10-07 LAB
ATRIAL RATE: 71 BPM
P AXIS: 101 DEGREES
P OFFSET: 124 MS
P ONSET: 104 MS
PR INTERVAL: 184 MS
Q ONSET: 184 MS
QRS COUNT: 12 BEATS
QRS DURATION: 132 MS
QT INTERVAL: 464 MS
QTC CALCULATION(BAZETT): 504 MS
QTC FREDERICIA: 491 MS
R AXIS: -18 DEGREES
T AXIS: 86 DEGREES
T OFFSET: 416 MS
VENTRICULAR RATE: 71 BPM

## 2024-10-08 ENCOUNTER — TELEPHONE (OUTPATIENT)
Dept: CARDIOLOGY CLINIC | Age: 79
End: 2024-10-08

## 2024-10-08 NOTE — TELEPHONE ENCOUNTER
Patient needs cardiac clearance for a Colpocleisis scheduled on 10/15 with  at , scanned EKG done at , patient was seen in 7/2024 and she denies any chest pain,SOB or palpitations and she is able to perform daily activities without cardiac symptoms, please advise

## 2024-10-09 NOTE — DISCHARGE INSTRUCTIONS
Call Dr. Rivers for any problems and/or concerns    *Walk as much as possible, it is ok to use stairs carefully  *Continue the coughing and deep breathing exercises that your learned in the hospital  *No lifting/straining greater than 10 pounds for 6 weeks (Avoid strenuous activity)  *Vaginal rest for 6 weeks (Do not put anything in your vagina. Do not use tampons or douches. Do not have sex until cleared by physician)  *Prevent constipation by using stool softeners and staying hydrated, so that you do not strain against your stitches or have pain from constipation    Call Doctor right away for:    *Fever above 100.4/shaking chills  *Bright red vaginal bleeding that soaks more than one sanitary pad per hour  *A foul smelling discharge from the vagina  *Trouble urinating or burning with urination  *Severe pain or bloating in your abdomen  *Persistent nausea/vomiting  *Redness, swelling, or drainage at your incision sites  *Chest pain/shortness of breath-call 911.    - You will be going home with prescriptions for pain medication. If you are able to take them, alternate a dose of Acetaminophen (Tylenol) and Ibuprofen (Motrin) every 3 hours. (For example, 1000mg of Tylenol at 09:00am, 600mg ibuprofen at 12:00pm, 1000mg of Tylenol at 3:00pm, 600mg ibuprofen at 6:00pm). Use any prescribed narcotic (ie oxycodone) for breakthrough pain as prescribed. Use a stool softener, such as Miralax, to prevent constipation from the narcotic medication. If you are going home with a prescription for estrogen cream, use vaginally as prescribed nightly until your 2 week post-op visit.

## 2024-10-14 NOTE — H&P
History Of Present Illness  Vilma Sagastume is a 78 y.o. female presents with vaginal vault prolapse after hysterectomy scheduled for vaginal suspension/fixation sacrospinous, colporrhaphy anterior and posterior vaginal wall, suspension with urinary sling .     Pre-op labs: Hgb 10.8 Plt 239 Cr 0.95      Past Medical History  She has a past medical history of Arthritis, Cardiomyopathy, CHF (congestive heart failure), COPD (chronic obstructive pulmonary disease) (Multi), Depression, Hyperlipidemia, Hypertension, Prolapse of female pelvic organs, Pulmonary embolism, and Sleep apnea.    Surgical History  She has a past surgical history that includes Hysterectomy; Appendectomy; Hernia repair; Vein ligation and stripping; Bladder repair; Knee Arthroplasty; and Dilation and curettage of uterus.     OB History  OB History   No obstetric history on file.       Sexual History  Social History     Substance and Sexual Activity   Sexual Activity Not on file        Social History  She reports that she has quit smoking. Her smoking use included cigarettes. She has never used smokeless tobacco. She reports current alcohol use. She reports that she does not use drugs.    Family History  Family History   Problem Relation Name Age of Onset    Breast cancer Mother      Cardiomyopathy Father          Allergies  Cefoxitin, Nickel, Penicillins, Sulfa (sulfonamide antibiotics), and Sulfur dioxide    Review of Systems   All other systems reviewed and are negative.       Physical Exam  Constitutional:       Appearance: Normal appearance.   HENT:      Head: Normocephalic and atraumatic.      Nose: Nose normal.      Mouth/Throat:      Mouth: Mucous membranes are moist.      Pharynx: No oropharyngeal exudate or posterior oropharyngeal erythema.   Eyes:      Extraocular Movements: Extraocular movements intact.      Conjunctiva/sclera: Conjunctivae normal.   Pulmonary:      Effort: Pulmonary effort is normal.   Musculoskeletal:      Cervical back:  Normal range of motion.   Skin:     Findings: No bruising, erythema, lesion or rash.   Neurological:      General: No focal deficit present.      Mental Status: She is alert.   Psychiatric:         Mood and Affect: Mood normal.         Behavior: Behavior normal.         Thought Content: Thought content normal.         Judgment: Judgment normal.          Last Recorded Vitals  There were no vitals taken for this visit.    Relevant Results  Medications  No current facility-administered medications for this encounter.    Current Outpatient Medications:     acetaminophen (Tylenol 8 HOUR) 650 mg ER tablet, Take 1 tablet (650 mg) by mouth every 8 hours if needed for mild pain (1 - 3). Do not crush, chew, or split., Disp: , Rfl:     allopurinol (Zyloprim) 300 mg tablet, Take 1 tablet (300 mg) by mouth once daily., Disp: , Rfl:     atorvastatin (Lipitor) 40 mg tablet, Take 1 tablet (40 mg) by mouth once daily., Disp: , Rfl:     buPROPion XL (Wellbutrin XL) 300 mg 24 hr tablet, Take 1 tablet (300 mg) by mouth once daily in the morning., Disp: , Rfl:     calcitriol (Rocaltrol) 0.25 mcg capsule, Take 1 capsule (0.25 mcg) by mouth once daily., Disp: , Rfl:     carvedilol (Coreg) 12.5 mg tablet, Take 1 tablet (12.5 mg) by mouth 2 times a day., Disp: , Rfl:     chlorhexidine (Peridex) 0.12 % solution, 15 milliliter(s) orally once a day for 2 doses 15 ml  the night before surgery and 15 ml morning of surgery - swish for 30 seconds -DO NOT SWALLOW, SPIT OUT, Disp: 473 mL, Rfl: 0    denosumab (Prolia) 60 mg/mL syringe, Inject 1 mL (60 mg) under the skin every 6 months., Disp: , Rfl:     estradiol (Estrace) 0.01 % (0.1 mg/gram) vaginal cream, Apply nightly for 3 weeks, then 3 times per week. (Patient not taking: Reported on 10/4/2024), Disp: 42.5 g, Rfl: 5    fluconazole (Diflucan) 100 mg tablet, Take 100mg BID for one day, then daily for three days. (Patient not taking: Reported on 10/4/2024), Disp: 5 tablet, Rfl: 0    losartan  (Cozaar) 25 mg tablet, Take 1 tablet (25 mg) by mouth once daily., Disp: , Rfl:     spironolactone (Aldactone) 25 mg tablet, Take 1 tablet (25 mg) by mouth 1 time., Disp: , Rfl:     tiotropium-olodateroL (Stiolto Respimat) 2.5-2.5 mcg/actuation mist inhaler, Inhale 2 Inhalations once daily., Disp: , Rfl:     torsemide (Demadex) 20 mg tablet, Take 1 tablet (20 mg) by mouth once daily., Disp: , Rfl:         Assessment/Plan   Assessment & Plan  Vaginal vault prolapse after hysterectomy      -Pt to OR  - consent form to be signed        To be seen and d/w Dr. Tita Lechuga MD, PGY3

## 2024-10-14 NOTE — HOSPITAL COURSE
78-year-old female presents with vaginal vault prolapse after hysterectomy scheduled for vaginal suspension/fixation sacrospinous, colporrhaphy anterior and posterior vaginal wall, suspension with urinary sling .     Pre-op labs: Hgb 10.8 Plt 239 Cr 0.95

## 2024-10-15 ENCOUNTER — HOSPITAL ENCOUNTER (OUTPATIENT)
Facility: HOSPITAL | Age: 79
Discharge: HOME | End: 2024-10-16
Attending: UROLOGY | Admitting: UROLOGY
Payer: MEDICARE

## 2024-10-15 ENCOUNTER — ANESTHESIA (OUTPATIENT)
Dept: OPERATING ROOM | Facility: HOSPITAL | Age: 79
End: 2024-10-15
Payer: MEDICARE

## 2024-10-15 DIAGNOSIS — G89.18 POST-OP PAIN: Primary | ICD-10-CM

## 2024-10-15 DIAGNOSIS — K59.00 CONSTIPATION, UNSPECIFIED CONSTIPATION TYPE: ICD-10-CM

## 2024-10-15 DIAGNOSIS — N99.3 VAGINAL VAULT PROLAPSE AFTER HYSTERECTOMY: ICD-10-CM

## 2024-10-15 PROBLEM — Z98.890: Status: ACTIVE | Noted: 2024-10-15

## 2024-10-15 PROCEDURE — 2500000004 HC RX 250 GENERAL PHARMACY W/ HCPCS (ALT 636 FOR OP/ED): Mod: JZ | Performed by: OBSTETRICS & GYNECOLOGY

## 2024-10-15 PROCEDURE — 2780000003 HC OR 278 NO HCPCS: Performed by: UROLOGY

## 2024-10-15 PROCEDURE — C2631 REP DEV, URINARY, W/O SLING: HCPCS | Performed by: UROLOGY

## 2024-10-15 PROCEDURE — 3600000008 HC OR TIME - EACH INCREMENTAL 1 MINUTE - PROCEDURE LEVEL THREE: Performed by: UROLOGY

## 2024-10-15 PROCEDURE — 2720000007 HC OR 272 NO HCPCS: Performed by: UROLOGY

## 2024-10-15 PROCEDURE — 96372 THER/PROPH/DIAG INJ SC/IM: CPT | Mod: 59 | Performed by: OBSTETRICS & GYNECOLOGY

## 2024-10-15 PROCEDURE — 2500000004 HC RX 250 GENERAL PHARMACY W/ HCPCS (ALT 636 FOR OP/ED): Performed by: ANESTHESIOLOGIST ASSISTANT

## 2024-10-15 PROCEDURE — 3700000001 HC GENERAL ANESTHESIA TIME - INITIAL BASE CHARGE: Performed by: UROLOGY

## 2024-10-15 PROCEDURE — 2500000004 HC RX 250 GENERAL PHARMACY W/ HCPCS (ALT 636 FOR OP/ED): Performed by: ANESTHESIOLOGY

## 2024-10-15 PROCEDURE — 2500000005 HC RX 250 GENERAL PHARMACY W/O HCPCS: Performed by: ANESTHESIOLOGIST ASSISTANT

## 2024-10-15 PROCEDURE — 2500000004 HC RX 250 GENERAL PHARMACY W/ HCPCS (ALT 636 FOR OP/ED): Performed by: UROLOGY

## 2024-10-15 PROCEDURE — 57240 ANTERIOR COLPORRHAPHY: CPT | Performed by: UROLOGY

## 2024-10-15 PROCEDURE — 2500000001 HC RX 250 WO HCPCS SELF ADMINISTERED DRUGS (ALT 637 FOR MEDICARE OP): Performed by: OBSTETRICS & GYNECOLOGY

## 2024-10-15 PROCEDURE — 7100000011 HC EXTENDED STAY RECOVERY HOURLY - NURSING UNIT

## 2024-10-15 PROCEDURE — 2500000004 HC RX 250 GENERAL PHARMACY W/ HCPCS (ALT 636 FOR OP/ED): Performed by: OBSTETRICS & GYNECOLOGY

## 2024-10-15 PROCEDURE — 3600000003 HC OR TIME - INITIAL BASE CHARGE - PROCEDURE LEVEL THREE: Performed by: UROLOGY

## 2024-10-15 PROCEDURE — 7100000002 HC RECOVERY ROOM TIME - EACH INCREMENTAL 1 MINUTE: Performed by: UROLOGY

## 2024-10-15 PROCEDURE — 2500000005 HC RX 250 GENERAL PHARMACY W/O HCPCS: Performed by: ANESTHESIOLOGY

## 2024-10-15 PROCEDURE — C1771 REP DEV, URINARY, W/SLING: HCPCS | Performed by: UROLOGY

## 2024-10-15 PROCEDURE — 7100000001 HC RECOVERY ROOM TIME - INITIAL BASE CHARGE: Performed by: UROLOGY

## 2024-10-15 PROCEDURE — 57282 COLPOPEXY EXTRAPERITONEAL: CPT | Performed by: UROLOGY

## 2024-10-15 PROCEDURE — 57288 REPAIR BLADDER DEFECT: CPT | Performed by: UROLOGY

## 2024-10-15 PROCEDURE — 3700000002 HC GENERAL ANESTHESIA TIME - EACH INCREMENTAL 1 MINUTE: Performed by: UROLOGY

## 2024-10-15 RX ORDER — LIDOCAINE HYDROCHLORIDE 20 MG/ML
INJECTION, SOLUTION EPIDURAL; INFILTRATION; INTRACAUDAL; PERINEURAL AS NEEDED
Status: DISCONTINUED | OUTPATIENT
Start: 2024-10-15 | End: 2024-10-15

## 2024-10-15 RX ORDER — DIPHENHYDRAMINE HYDROCHLORIDE 50 MG/ML
12.5 INJECTION INTRAMUSCULAR; INTRAVENOUS ONCE AS NEEDED
Status: DISCONTINUED | OUTPATIENT
Start: 2024-10-15 | End: 2024-10-15 | Stop reason: HOSPADM

## 2024-10-15 RX ORDER — LIDOCAINE HYDROCHLORIDE 10 MG/ML
0.1 INJECTION, SOLUTION INFILTRATION; PERINEURAL ONCE
Status: DISCONTINUED | OUTPATIENT
Start: 2024-10-15 | End: 2024-10-15 | Stop reason: HOSPADM

## 2024-10-15 RX ORDER — SIMETHICONE 80 MG
80 TABLET,CHEWABLE ORAL 4 TIMES DAILY PRN
Status: DISCONTINUED | OUTPATIENT
Start: 2024-10-15 | End: 2024-10-16 | Stop reason: HOSPADM

## 2024-10-15 RX ORDER — IBUPROFEN 600 MG/1
600 TABLET ORAL EVERY 6 HOURS
Qty: 30 TABLET | Refills: 0 | Status: SHIPPED | OUTPATIENT
Start: 2024-10-15

## 2024-10-15 RX ORDER — FENTANYL CITRATE 50 UG/ML
INJECTION, SOLUTION INTRAMUSCULAR; INTRAVENOUS AS NEEDED
Status: DISCONTINUED | OUTPATIENT
Start: 2024-10-15 | End: 2024-10-15

## 2024-10-15 RX ORDER — OXYCODONE HYDROCHLORIDE 5 MG/1
5 TABLET ORAL EVERY 4 HOURS PRN
Status: DISCONTINUED | OUTPATIENT
Start: 2024-10-15 | End: 2024-10-15 | Stop reason: HOSPADM

## 2024-10-15 RX ORDER — KETOROLAC TROMETHAMINE 15 MG/ML
15 INJECTION, SOLUTION INTRAMUSCULAR; INTRAVENOUS EVERY 6 HOURS
Status: DISCONTINUED | OUTPATIENT
Start: 2024-10-15 | End: 2024-10-16 | Stop reason: HOSPADM

## 2024-10-15 RX ORDER — BUPROPION HYDROCHLORIDE 150 MG/1
300 TABLET ORAL EVERY MORNING
Status: DISCONTINUED | OUTPATIENT
Start: 2024-10-16 | End: 2024-10-16 | Stop reason: HOSPADM

## 2024-10-15 RX ORDER — HYDROMORPHONE HYDROCHLORIDE 0.2 MG/ML
0.2 INJECTION INTRAMUSCULAR; INTRAVENOUS; SUBCUTANEOUS EVERY 5 MIN PRN
Status: DISCONTINUED | OUTPATIENT
Start: 2024-10-15 | End: 2024-10-15 | Stop reason: HOSPADM

## 2024-10-15 RX ORDER — HEPARIN SODIUM 5000 [USP'U]/ML
5000 INJECTION, SOLUTION INTRAVENOUS; SUBCUTANEOUS EVERY 8 HOURS
Status: DISCONTINUED | OUTPATIENT
Start: 2024-10-15 | End: 2024-10-16 | Stop reason: HOSPADM

## 2024-10-15 RX ORDER — ACETAMINOPHEN 500 MG
1000 TABLET ORAL EVERY 6 HOURS
Qty: 60 TABLET | Refills: 0 | Status: SHIPPED | OUTPATIENT
Start: 2024-10-15

## 2024-10-15 RX ORDER — TORSEMIDE 20 MG/1
20 TABLET ORAL DAILY
Status: DISCONTINUED | OUTPATIENT
Start: 2024-10-16 | End: 2024-10-16 | Stop reason: HOSPADM

## 2024-10-15 RX ORDER — DEXAMETHASONE SODIUM PHOSPHATE 10 MG/ML
INJECTION INTRAMUSCULAR; INTRAVENOUS AS NEEDED
Status: DISCONTINUED | OUTPATIENT
Start: 2024-10-15 | End: 2024-10-15

## 2024-10-15 RX ORDER — CLINDAMYCIN PHOSPHATE 900 MG/50ML
900 INJECTION, SOLUTION INTRAVENOUS ONCE
Status: COMPLETED | OUTPATIENT
Start: 2024-10-15 | End: 2024-10-15

## 2024-10-15 RX ORDER — SODIUM CHLORIDE, SODIUM LACTATE, POTASSIUM CHLORIDE, CALCIUM CHLORIDE 600; 310; 30; 20 MG/100ML; MG/100ML; MG/100ML; MG/100ML
20 INJECTION, SOLUTION INTRAVENOUS CONTINUOUS
Status: DISCONTINUED | OUTPATIENT
Start: 2024-10-15 | End: 2024-10-15

## 2024-10-15 RX ORDER — OXYCODONE HYDROCHLORIDE 10 MG/1
10 TABLET ORAL EVERY 4 HOURS PRN
Status: DISCONTINUED | OUTPATIENT
Start: 2024-10-15 | End: 2024-10-16 | Stop reason: HOSPADM

## 2024-10-15 RX ORDER — CALCITRIOL 0.25 UG/1
0.25 CAPSULE ORAL DAILY
Status: DISCONTINUED | OUTPATIENT
Start: 2024-10-15 | End: 2024-10-16 | Stop reason: HOSPADM

## 2024-10-15 RX ORDER — ALLOPURINOL 300 MG/1
300 TABLET ORAL DAILY
Status: DISCONTINUED | OUTPATIENT
Start: 2024-10-15 | End: 2024-10-16 | Stop reason: HOSPADM

## 2024-10-15 RX ORDER — CARVEDILOL 12.5 MG/1
12.5 TABLET ORAL 2 TIMES DAILY
Status: DISCONTINUED | OUTPATIENT
Start: 2024-10-15 | End: 2024-10-16 | Stop reason: HOSPADM

## 2024-10-15 RX ORDER — POLYETHYLENE GLYCOL 3350 17 G/17G
17 POWDER, FOR SOLUTION ORAL DAILY
Qty: 510 G | Refills: 0 | Status: SHIPPED | OUTPATIENT
Start: 2024-10-15

## 2024-10-15 RX ORDER — SODIUM CHLORIDE, SODIUM LACTATE, POTASSIUM CHLORIDE, CALCIUM CHLORIDE 600; 310; 30; 20 MG/100ML; MG/100ML; MG/100ML; MG/100ML
100 INJECTION, SOLUTION INTRAVENOUS CONTINUOUS
Status: DISCONTINUED | OUTPATIENT
Start: 2024-10-15 | End: 2024-10-15 | Stop reason: HOSPADM

## 2024-10-15 RX ORDER — ONDANSETRON HYDROCHLORIDE 2 MG/ML
INJECTION, SOLUTION INTRAVENOUS AS NEEDED
Status: DISCONTINUED | OUTPATIENT
Start: 2024-10-15 | End: 2024-10-15

## 2024-10-15 RX ORDER — OXYCODONE HYDROCHLORIDE 5 MG/1
5 TABLET ORAL EVERY 6 HOURS PRN
Qty: 8 TABLET | Refills: 0 | Status: SHIPPED | OUTPATIENT
Start: 2024-10-15 | End: 2024-10-18

## 2024-10-15 RX ORDER — OXYCODONE HYDROCHLORIDE 5 MG/1
5 TABLET ORAL EVERY 4 HOURS PRN
Status: DISCONTINUED | OUTPATIENT
Start: 2024-10-15 | End: 2024-10-16 | Stop reason: HOSPADM

## 2024-10-15 RX ORDER — NALOXONE HYDROCHLORIDE 0.4 MG/ML
0.1 INJECTION, SOLUTION INTRAMUSCULAR; INTRAVENOUS; SUBCUTANEOUS EVERY 5 MIN PRN
Status: DISCONTINUED | OUTPATIENT
Start: 2024-10-15 | End: 2024-10-16 | Stop reason: HOSPADM

## 2024-10-15 RX ORDER — ATORVASTATIN CALCIUM 40 MG/1
40 TABLET, FILM COATED ORAL NIGHTLY
Status: DISCONTINUED | OUTPATIENT
Start: 2024-10-15 | End: 2024-10-16 | Stop reason: HOSPADM

## 2024-10-15 RX ORDER — ACETAMINOPHEN 325 MG/1
975 TABLET ORAL EVERY 6 HOURS
Status: DISCONTINUED | OUTPATIENT
Start: 2024-10-15 | End: 2024-10-16 | Stop reason: HOSPADM

## 2024-10-15 RX ORDER — ALBUTEROL SULFATE 0.83 MG/ML
2.5 SOLUTION RESPIRATORY (INHALATION) ONCE AS NEEDED
Status: DISCONTINUED | OUTPATIENT
Start: 2024-10-15 | End: 2024-10-15 | Stop reason: HOSPADM

## 2024-10-15 RX ORDER — ROCURONIUM BROMIDE 50 MG/5 ML
SYRINGE (ML) INTRAVENOUS AS NEEDED
Status: DISCONTINUED | OUTPATIENT
Start: 2024-10-15 | End: 2024-10-15

## 2024-10-15 RX ORDER — LIDOCAINE HYDROCHLORIDE AND EPINEPHRINE 5; 5 MG/ML; UG/ML
INJECTION, SOLUTION INFILTRATION; PERINEURAL AS NEEDED
Status: DISCONTINUED | OUTPATIENT
Start: 2024-10-15 | End: 2024-10-15 | Stop reason: HOSPADM

## 2024-10-15 RX ORDER — POLYETHYLENE GLYCOL 3350 17 G/17G
17 POWDER, FOR SOLUTION ORAL DAILY
Status: DISCONTINUED | OUTPATIENT
Start: 2024-10-15 | End: 2024-10-16 | Stop reason: HOSPADM

## 2024-10-15 RX ORDER — PROPOFOL 10 MG/ML
INJECTION, EMULSION INTRAVENOUS AS NEEDED
Status: DISCONTINUED | OUTPATIENT
Start: 2024-10-15 | End: 2024-10-15

## 2024-10-15 RX ORDER — ONDANSETRON HYDROCHLORIDE 2 MG/ML
4 INJECTION, SOLUTION INTRAVENOUS ONCE AS NEEDED
Status: DISCONTINUED | OUTPATIENT
Start: 2024-10-15 | End: 2024-10-15 | Stop reason: HOSPADM

## 2024-10-15 RX ORDER — AMOXICILLIN 250 MG
1 CAPSULE ORAL 2 TIMES DAILY PRN
Status: DISCONTINUED | OUTPATIENT
Start: 2024-10-15 | End: 2024-10-16 | Stop reason: HOSPADM

## 2024-10-15 RX ORDER — HYDRALAZINE HYDROCHLORIDE 20 MG/ML
5 INJECTION INTRAMUSCULAR; INTRAVENOUS EVERY 30 MIN PRN
Status: DISCONTINUED | OUTPATIENT
Start: 2024-10-15 | End: 2024-10-15 | Stop reason: HOSPADM

## 2024-10-15 RX ORDER — LABETALOL HYDROCHLORIDE 5 MG/ML
5 INJECTION, SOLUTION INTRAVENOUS ONCE AS NEEDED
Status: DISCONTINUED | OUTPATIENT
Start: 2024-10-15 | End: 2024-10-15 | Stop reason: HOSPADM

## 2024-10-15 RX ORDER — OXYCODONE AND ACETAMINOPHEN 5; 325 MG/1; MG/1
1 TABLET ORAL EVERY 4 HOURS PRN
Status: DISCONTINUED | OUTPATIENT
Start: 2024-10-15 | End: 2024-10-15 | Stop reason: HOSPADM

## 2024-10-15 RX ORDER — IBUPROFEN 600 MG/1
600 TABLET ORAL EVERY 6 HOURS
Status: DISCONTINUED | OUTPATIENT
Start: 2024-10-16 | End: 2024-10-16 | Stop reason: HOSPADM

## 2024-10-15 RX ORDER — ACETAMINOPHEN 325 MG/1
975 TABLET ORAL ONCE
Status: DISCONTINUED | OUTPATIENT
Start: 2024-10-15 | End: 2024-10-15 | Stop reason: HOSPADM

## 2024-10-15 RX ORDER — ONDANSETRON HYDROCHLORIDE 2 MG/ML
4 INJECTION, SOLUTION INTRAVENOUS EVERY 6 HOURS PRN
Status: DISCONTINUED | OUTPATIENT
Start: 2024-10-15 | End: 2024-10-16 | Stop reason: HOSPADM

## 2024-10-15 SDOH — SOCIAL STABILITY: SOCIAL INSECURITY: ARE THERE ANY APPARENT SIGNS OF INJURIES/BEHAVIORS THAT COULD BE RELATED TO ABUSE/NEGLECT?: NO

## 2024-10-15 SDOH — SOCIAL STABILITY: SOCIAL INSECURITY: DOES ANYONE TRY TO KEEP YOU FROM HAVING/CONTACTING OTHER FRIENDS OR DOING THINGS OUTSIDE YOUR HOME?: NO

## 2024-10-15 SDOH — SOCIAL STABILITY: SOCIAL INSECURITY
WITHIN THE LAST YEAR, HAVE YOU BEEN KICKED, HIT, SLAPPED, OR OTHERWISE PHYSICALLY HURT BY YOUR PARTNER OR EX-PARTNER?: NO

## 2024-10-15 SDOH — SOCIAL STABILITY: SOCIAL INSECURITY: WITHIN THE LAST YEAR, HAVE YOU BEEN HUMILIATED OR EMOTIONALLY ABUSED IN OTHER WAYS BY YOUR PARTNER OR EX-PARTNER?: NO

## 2024-10-15 SDOH — ECONOMIC STABILITY: INCOME INSECURITY: IN THE PAST 12 MONTHS HAS THE ELECTRIC, GAS, OIL, OR WATER COMPANY THREATENED TO SHUT OFF SERVICES IN YOUR HOME?: NO

## 2024-10-15 SDOH — SOCIAL STABILITY: SOCIAL INSECURITY: WITHIN THE LAST YEAR, HAVE YOU BEEN AFRAID OF YOUR PARTNER OR EX-PARTNER?: NO

## 2024-10-15 SDOH — ECONOMIC STABILITY: FOOD INSECURITY: WITHIN THE PAST 12 MONTHS, THE FOOD YOU BOUGHT JUST DIDN'T LAST AND YOU DIDN'T HAVE MONEY TO GET MORE.: NEVER TRUE

## 2024-10-15 SDOH — SOCIAL STABILITY: SOCIAL INSECURITY: HAVE YOU HAD THOUGHTS OF HARMING ANYONE ELSE?: NO

## 2024-10-15 SDOH — ECONOMIC STABILITY: FOOD INSECURITY: WITHIN THE PAST 12 MONTHS, YOU WORRIED THAT YOUR FOOD WOULD RUN OUT BEFORE YOU GOT THE MONEY TO BUY MORE.: NEVER TRUE

## 2024-10-15 SDOH — SOCIAL STABILITY: SOCIAL INSECURITY
WITHIN THE LAST YEAR, HAVE YOU BEEN RAPED OR FORCED TO HAVE ANY KIND OF SEXUAL ACTIVITY BY YOUR PARTNER OR EX-PARTNER?: NO

## 2024-10-15 SDOH — SOCIAL STABILITY: SOCIAL INSECURITY: WERE YOU ABLE TO COMPLETE ALL THE BEHAVIORAL HEALTH SCREENINGS?: YES

## 2024-10-15 SDOH — SOCIAL STABILITY: SOCIAL INSECURITY: HAS ANYONE EVER THREATENED TO HURT YOUR FAMILY OR YOUR PETS?: NO

## 2024-10-15 SDOH — SOCIAL STABILITY: SOCIAL INSECURITY: DO YOU FEEL UNSAFE GOING BACK TO THE PLACE WHERE YOU ARE LIVING?: NO

## 2024-10-15 SDOH — SOCIAL STABILITY: SOCIAL INSECURITY: HAVE YOU HAD ANY THOUGHTS OF HARMING ANYONE ELSE?: NO

## 2024-10-15 SDOH — SOCIAL STABILITY: SOCIAL INSECURITY: ABUSE: ADULT

## 2024-10-15 SDOH — SOCIAL STABILITY: SOCIAL INSECURITY: DO YOU FEEL ANYONE HAS EXPLOITED OR TAKEN ADVANTAGE OF YOU FINANCIALLY OR OF YOUR PERSONAL PROPERTY?: NO

## 2024-10-15 SDOH — SOCIAL STABILITY: SOCIAL INSECURITY: ARE YOU OR HAVE YOU BEEN THREATENED OR ABUSED PHYSICALLY, EMOTIONALLY, OR SEXUALLY BY ANYONE?: NO

## 2024-10-15 SDOH — HEALTH STABILITY: MENTAL HEALTH: CURRENT SMOKER: 0

## 2024-10-15 ASSESSMENT — COGNITIVE AND FUNCTIONAL STATUS - GENERAL
DAILY ACTIVITIY SCORE: 24
MOBILITY SCORE: 24
PATIENT BASELINE BEDBOUND: NO
MOBILITY SCORE: 24
DAILY ACTIVITIY SCORE: 24

## 2024-10-15 ASSESSMENT — ACTIVITIES OF DAILY LIVING (ADL)
BATHING: INDEPENDENT
DRESSING YOURSELF: INDEPENDENT
FEEDING YOURSELF: INDEPENDENT
HEARING - LEFT EAR: FUNCTIONAL
GROOMING: INDEPENDENT
PATIENT'S MEMORY ADEQUATE TO SAFELY COMPLETE DAILY ACTIVITIES?: YES
WALKS IN HOME: INDEPENDENT
LACK_OF_TRANSPORTATION: NO
JUDGMENT_ADEQUATE_SAFELY_COMPLETE_DAILY_ACTIVITIES: YES
TOILETING: INDEPENDENT
ADEQUATE_TO_COMPLETE_ADL: YES
HEARING - RIGHT EAR: FUNCTIONAL

## 2024-10-15 ASSESSMENT — PAIN SCALES - GENERAL
PAINLEVEL_OUTOF10: 0 - NO PAIN
PAINLEVEL_OUTOF10: 5 - MODERATE PAIN
PAINLEVEL_OUTOF10: 2
PAINLEVEL_OUTOF10: 0 - NO PAIN
PAINLEVEL_OUTOF10: 3
PAINLEVEL_OUTOF10: 2
PAINLEVEL_OUTOF10: 3
PAINLEVEL_OUTOF10: 2
PAINLEVEL_OUTOF10: 3
PAINLEVEL_OUTOF10: 5 - MODERATE PAIN
PAINLEVEL_OUTOF10: 2

## 2024-10-15 ASSESSMENT — PAIN - FUNCTIONAL ASSESSMENT
PAIN_FUNCTIONAL_ASSESSMENT: 0-10

## 2024-10-15 ASSESSMENT — LIFESTYLE VARIABLES
HOW OFTEN DO YOU HAVE A DRINK CONTAINING ALCOHOL: NEVER
AUDIT-C TOTAL SCORE: 0
HOW OFTEN DO YOU HAVE 6 OR MORE DRINKS ON ONE OCCASION: NEVER
SKIP TO QUESTIONS 9-10: 1
HOW MANY STANDARD DRINKS CONTAINING ALCOHOL DO YOU HAVE ON A TYPICAL DAY: PATIENT DOES NOT DRINK
AUDIT-C TOTAL SCORE: 0

## 2024-10-15 ASSESSMENT — PATIENT HEALTH QUESTIONNAIRE - PHQ9
1. LITTLE INTEREST OR PLEASURE IN DOING THINGS: NEARLY EVERY DAY
SUM OF ALL RESPONSES TO PHQ9 QUESTIONS 1 & 2: 6
2. FEELING DOWN, DEPRESSED OR HOPELESS: NEARLY EVERY DAY

## 2024-10-15 ASSESSMENT — COLUMBIA-SUICIDE SEVERITY RATING SCALE - C-SSRS
6. HAVE YOU EVER DONE ANYTHING, STARTED TO DO ANYTHING, OR PREPARED TO DO ANYTHING TO END YOUR LIFE?: NO
1. IN THE PAST MONTH, HAVE YOU WISHED YOU WERE DEAD OR WISHED YOU COULD GO TO SLEEP AND NOT WAKE UP?: NO
2. HAVE YOU ACTUALLY HAD ANY THOUGHTS OF KILLING YOURSELF?: NO

## 2024-10-15 ASSESSMENT — PAIN DESCRIPTION - DESCRIPTORS: DESCRIPTORS: HEAVINESS;PRESSURE

## 2024-10-15 NOTE — ANESTHESIA PREPROCEDURE EVALUATION
Patient: Vilma Sagastume    Procedure Information       Anesthesia Start Date/Time: 10/15/24 1401    Procedures:       Sacrospinous ligament fixation      Anterior repair, possible posterior repair      Midurethral sling, cystoscopy    Location: [facility] OR 08 / [facility]    Surgeons: Bethel Rivers MD            Relevant Problems   No relevant active problems       Clinical information reviewed:   Tobacco  Allergies  Meds   Med Hx  Surg Hx   Fam Hx  Soc Hx        NPO Detail:  NPO/Void Status  Date of Last Liquid: 10/15/24  Time of Last Liquid: 0745  Date of Last Solid: 10/14/24  Time of Last Solid: 2200  Time of Last Void: 1030         Physical Exam    Airway  Mallampati: II  TM distance: >3 FB     Cardiovascular   Rhythm: regular  Rate: normal     Dental   (+) upper dentures, lower dentures     Pulmonary   Breath sounds clear to auscultation     Abdominal            Anesthesia Plan    History of general anesthesia?: yes  History of complications of general anesthesia?: no    ASA 3     general     The patient is not a current smoker.    intravenous induction   Postoperative administration of opioids is intended.  Anesthetic plan and risks discussed with patient.    Plan discussed with CAA.

## 2024-10-15 NOTE — OP NOTE
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]      * Stress urinary incontinence     Procedures  Sacrospinous ligament fixation  Anterior repair  Midurethral sling  Cystoscopy    Surgeons      * Bethel Rivers - Primary    Resident/Fellow/Other Assistant:  Surgeons and Role:     * Afua Kingsley MD - Fellow    Procedure Summary  Anesthesia: General  ASA: III  Anesthesia Staff: Anesthesiologist: Casper Benitez MD  C-AA: OLIVERIO Steward; OLIVERIO Singleton  Estimated Blood Loss: 25 mL  Intra-op Medications:   Administrations occurring from 1145 to 1515 on 10/15/24:   Medication Name Total Dose   lactated Ringer's infusion Cannot be calculated   clindamycin (Cleocin) 900 mg in dextrose 5% IV 50 mL 900 mg              Anesthesia Record               Intraprocedure I/O Totals          Intake    lactated Ringer's infusion 200.00 mL    Total Intake 200 mL          Specimen: No specimens collected     Staff:   Circulator: Gabi  Scrub Person: Catherine         Drains and/or Catheters:   Urethral Catheter Latex 16 Fr. (Active)       Implants: Lynx retropubic midurethral sling    Findings:   Stage 3 vaginal vault prolapse with good correction at the end of the case  Granulation tissue along lateral aspects of vaginal vault  Normal bladder with brisk bilateral efflux of urine from ureteral orifices, no injury or foreign body  Normal rectovaginal exam without evidence of rectovaginal fistula    Indications: Vilma Sagastume is an 78 y.o. 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     The patient was seen in the  preoperative area. The risks, benefits, complications, treatment options, non-operative alternatives, expected recovery and outcomes were discussed with the patient. The possibilities of reaction to medication, pulmonary aspiration, injury to surrounding structures, bleeding, recurrent infection, the need for additional procedures, failure to diagnose a condition, and creating a complication requiring transfusion or operation were discussed with the patient. The patient concurred with the proposed plan, giving informed consent.  The site of surgery was properly noted/marked if necessary per policy. The patient has been actively warmed in preoperative area. Preoperative antibiotics have been ordered and given within 1 hours of incision. Venous thrombosis prophylaxis have been ordered including bilateral sequential compression devices    Procedure Details:   The patient was interviewed in the preop holding area by the surgical team, where the risks, benefits, and indication of the procedure were reviewed.  She was then transferred to the operating suite where the patient was properly identified and the procedure was confirmed. When adequate anesthesia was obtained, the patient was prepped and draped in a dorsal lithotomy position. A time-out was performed. Preoperative antibiotics were given.    Rectovaginal exam was performed at the beginning of the case and no rectovaginal fistula was appreciated. A thomas was placed to gravity and the bladder was completely drained. The Lonestar retractor was placed around the perineum. Attention was turned to the anterior wall. Two Allis clamps were placed on the anterior wall, one underneath and proximal to the urethra, and one at the most dependent portion of the cystocele.  1% lidocaine with epinephrine was injected over the anterior vaginal wall up to the apex of the cystocele. A vaginal incision was performed using a scalpel over the anterior vaginal epithelium up to the apex  of the cystocele. Sharp dissection was carried out bilaterally and the perivesical fascia mobilized off of the vaginal epithelium bilaterally. The bladder was mobilized. The granulation tissue along the vaginal epithelium was excised using the Metzenbaum scissors and then cauterized. Using careful blunt dissection, the paravesical fascia and fibromuscular vaginal wall was further mobilized on the patients right down to the level of the iscial spine. This was easily palpated. The peritoneum was not entered during this dissection. The sacrospinous ligament on the right was easily palpated from its source on the ischial spine as it coursed posteriorly and medially towards the sacrum.  A Capio device was loaded with an 0 PDS suture and introduced over the sacrospinous ligament. With the ischial spine palpated just laterally and the rectum retracted medially, the Capio device was fired and the PDS suture pulled through the ligament. Excellent placement was confirmed on palpation. A second suture of the same was then placed 0.5-1cm medial to the previous suture. Excellent placement was similarly confirmed on palpation.    The cystocele repair was then performed with several vertical mattress sutures of 0 Vicryl, reapproximating the fascial defects in the midline. Cystoscopy was performed with findings as above. The Ellison catheter was replaced. The PDS sutures were then taken through the previously dissected vaginal apex using the needles and a free Mills needle on the free end. A small amount of vaginal epithelium was excised and the vaginal epithelium was then closed with a running stitch of 2-0 Vicryl. Hemostasis was satisfactory. The PDS sutures were then tied up to their sacrospinous attachment points with elevation of the vaginal apex and correction of the prolapse. An over-glove was placed and a careful rectal exam noted no rectal involvement.    Attention was turned to the sling. The anterior vaginal wall was  infiltrated with lidocaine 1% with epinephrine and a superficial incision starting approximately 1 cm inferior to the urethral meatus was carried out approximately 1.5 cm with a knife. From superficial to deep, the anterior vaginal wall was then dissected from the tissue surrounding the urethra until the pubic bone was reached. This procedure was repeated on the opposite side. Two small knife holes were then created in the mons pubis just cephalad and approximately 1.5 cm lateral to the pubic symphysis. The sling trocars were then inserted through the holes in the mons pubis and were brought out through the dissected space in the vagina bilaterally. A cystoscopy was performed and no defects were noted within the bladder wall. The mesh sling was then connected to the trocars and pulled through the dissected space. After the sling was tensioned appropriately using a Hegar dilator, the remainder of the mesh protruding from the mons pubis was cut, leaving just two puncture holes on the mons pubis. The anterior vaginal wall was then closed with a 2-0 Vicryl suture in a running, locked fashion. Dermabond was placed over the puncture holes located on the mons pubis. Ellison was left to gravity and vaginal packing was placed.    The patient was then awakened from anesthesia, having tolerated procedure well, and was taken to the recovery room in stable condition. All sponge, needle, and instrument counts were correct at the end the case.      Complications:  None; patient tolerated the procedure well.    Disposition: PACU - hemodynamically stable.  Condition: stable       Dr. Rivers was present for all portions of the procedure.    Afua Kingsley MD  Urogynecology and Reconstructive Pelvic Surgery Fellow  10/15/2024 4:07 PM

## 2024-10-15 NOTE — ANESTHESIA PROCEDURE NOTES
Airway  Date/Time: 10/15/2024 2:11 PM  Urgency: elective    Airway not difficult    Staffing  Performed: OLIVERIO   Authorized by: Casper Benitez MD    Performed by: OLIVERIO Singleton  Patient location during procedure: OR    Indications and Patient Condition  Indications for airway management: anesthesia  Spontaneous Ventilation: absent  Sedation level: deep  Preoxygenated: yes  Patient position: sniffing  MILS maintained throughout  Mask difficulty assessment: 1 - vent by mask    Final Airway Details  Final airway type: endotracheal airway      Successful airway: ETT  Cuffed: yes   Successful intubation technique: direct laryngoscopy  Endotracheal tube insertion site: oral  Blade: Juan Francisco  Blade size: #3  ETT size (mm): 7.0  Cormack-Lehane Classification: grade I - full view of glottis  Placement verified by: chest auscultation and capnometry   Cuff volume (mL): 6  Measured from: lips  ETT to lips (cm): 22  Number of attempts at approach: 1    Additional Comments  Lips and gums in pre anesthetic condition

## 2024-10-16 VITALS
WEIGHT: 171 LBS | HEIGHT: 61 IN | SYSTOLIC BLOOD PRESSURE: 96 MMHG | HEART RATE: 77 BPM | DIASTOLIC BLOOD PRESSURE: 59 MMHG | TEMPERATURE: 97.9 F | RESPIRATION RATE: 16 BRPM | BODY MASS INDEX: 32.28 KG/M2 | OXYGEN SATURATION: 94 %

## 2024-10-16 LAB
ANION GAP SERPL CALC-SCNC: 11 MMOL/L (ref 10–20)
BUN SERPL-MCNC: 24 MG/DL (ref 6–23)
CALCIUM SERPL-MCNC: 8.2 MG/DL (ref 8.6–10.3)
CHLORIDE SERPL-SCNC: 106 MMOL/L (ref 98–107)
CO2 SERPL-SCNC: 25 MMOL/L (ref 21–32)
CREAT SERPL-MCNC: 1.45 MG/DL (ref 0.5–1.05)
EGFRCR SERPLBLD CKD-EPI 2021: 37 ML/MIN/1.73M*2
ERYTHROCYTE [DISTWIDTH] IN BLOOD BY AUTOMATED COUNT: 14.3 % (ref 11.5–14.5)
GLUCOSE SERPL-MCNC: 198 MG/DL (ref 74–99)
HCT VFR BLD AUTO: 31 % (ref 36–46)
HGB BLD-MCNC: 9.7 G/DL (ref 12–16)
MCH RBC QN AUTO: 31.1 PG (ref 26–34)
MCHC RBC AUTO-ENTMCNC: 31.3 G/DL (ref 32–36)
MCV RBC AUTO: 99 FL (ref 80–100)
NRBC BLD-RTO: 0 /100 WBCS (ref 0–0)
PLATELET # BLD AUTO: 233 X10*3/UL (ref 150–450)
POTASSIUM SERPL-SCNC: 4.2 MMOL/L (ref 3.5–5.3)
RBC # BLD AUTO: 3.12 X10*6/UL (ref 4–5.2)
SODIUM SERPL-SCNC: 138 MMOL/L (ref 136–145)
WBC # BLD AUTO: 5.7 X10*3/UL (ref 4.4–11.3)

## 2024-10-16 PROCEDURE — 82374 ASSAY BLOOD CARBON DIOXIDE: CPT | Performed by: OBSTETRICS & GYNECOLOGY

## 2024-10-16 PROCEDURE — 2500000004 HC RX 250 GENERAL PHARMACY W/ HCPCS (ALT 636 FOR OP/ED): Performed by: OBSTETRICS & GYNECOLOGY

## 2024-10-16 PROCEDURE — 7100000011 HC EXTENDED STAY RECOVERY HOURLY - NURSING UNIT

## 2024-10-16 PROCEDURE — 2500000001 HC RX 250 WO HCPCS SELF ADMINISTERED DRUGS (ALT 637 FOR MEDICARE OP): Performed by: OBSTETRICS & GYNECOLOGY

## 2024-10-16 PROCEDURE — 36415 COLL VENOUS BLD VENIPUNCTURE: CPT | Performed by: OBSTETRICS & GYNECOLOGY

## 2024-10-16 PROCEDURE — 96372 THER/PROPH/DIAG INJ SC/IM: CPT | Performed by: OBSTETRICS & GYNECOLOGY

## 2024-10-16 PROCEDURE — 85027 COMPLETE CBC AUTOMATED: CPT | Performed by: OBSTETRICS & GYNECOLOGY

## 2024-10-16 ASSESSMENT — PAIN SCALES - GENERAL: PAINLEVEL_OUTOF10: 0 - NO PAIN

## 2024-10-16 ASSESSMENT — PAIN - FUNCTIONAL ASSESSMENT: PAIN_FUNCTIONAL_ASSESSMENT: 0-10

## 2024-10-16 NOTE — NURSING NOTE
1230: discharged instruction given, pt verbalized understanding. Discharged meds sent to preferred pharmacy, all question addressed. Pt discharged to home via private vehicle

## 2024-10-16 NOTE — PROGRESS NOTES
10/16/24 1102   Discharge Planning   Living Arrangements Alone   Support Systems Children   Type of Residence Private residence   Home or Post Acute Services None   Expected Discharge Disposition Home   Does the patient need discharge transport arranged? Yes     Met with patient and family at bedside. Admitted for vaginal prolapse. Pt lives alone and was independent PTA with no HHC or DME. PCP is Elvin Lopez. Pt feels she is able to manage her health and understands her medications. Pt is able to drive and obtain meds. Pt plans to return home with no new discharge needs. Family will provide transport.

## 2024-10-16 NOTE — ANESTHESIA POSTPROCEDURE EVALUATION
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    Anesthesia Post Evaluation    Patient location during evaluation: PACU  Patient participation: complete - patient participated  Level of consciousness: awake and alert  Pain management: satisfactory to patient  Airway patency: patent  Two or more strategies used to mitigate risk of obstructive sleep apnea  Cardiovascular status: acceptable  Respiratory status: acceptable  Hydration status: acceptable  Postoperative Nausea and Vomiting: none        No notable events documented.

## 2024-10-16 NOTE — PROGRESS NOTES
Vilma Sagastume 78 y.o. female    Subjective  Patient seen and examined this morning.  Denies nausea and vomiting.  No fever or chills. Tolerating diet. Minimal pain. Has not ambulated yet.  Has not voided yet since thomas catheter removal.  Denies CP and SOB. No acute events overnight.       Objective  PHYSICAL EXAM:  Physical Exam  Vitals reviewed.   Constitutional:       General: She is awake.      Appearance: Normal appearance.   Cardiovascular:      Rate and Rhythm: Normal rate and regular rhythm.      Pulses: Normal pulses.      Heart sounds: Normal heart sounds.   Pulmonary:      Effort: Pulmonary effort is normal.      Breath sounds: Normal breath sounds and air entry.   Abdominal:      General: Abdomen is flat. There is no distension.      Palpations: Abdomen is soft.      Tenderness: There is abdominal tenderness.      Comments: Soft, non-distended.  2 small incisions as suprapubic area well approximated, C/D/I.    Musculoskeletal:         General: Normal range of motion.      Cervical back: Normal range of motion.   Skin:     General: Skin is warm and dry.   Neurological:      General: No focal deficit present.      Mental Status: She is alert and oriented to person, place, and time.   Psychiatric:         Behavior: Behavior is cooperative.       Vital signs in last 24 hours:  Vitals:    10/16/24 0800   BP: 97/60   Pulse: 65   Resp: 16   Temp: 35.5 °C (95.9 °F)   SpO2: 94%         Intake/Output this shift:    Intake/Output Summary (Last 24 hours) at 10/16/2024 0963  Last data filed at 10/16/2024 0456  Gross per 24 hour   Intake 200 ml   Output 600 ml   Net -400 ml        Allergies:  Allergies   Allergen Reactions    Cefoxitin Anaphylaxis    Nickel Rash    Penicillins Hives    Sulfa (Sulfonamide Antibiotics) Rash    Sulfur Dioxide         Medications:  Scheduled medications  acetaminophen, 975 mg, oral, q6h  allopurinol, 300 mg, oral, Daily  atorvastatin, 40 mg, oral, Nightly  buPROPion XL, 300 mg, oral, q  AM  calcitriol, 0.25 mcg, oral, Daily  carvedilol, 12.5 mg, oral, BID  heparin (porcine), 5,000 Units, subcutaneous, q8h  ibuprofen, 600 mg, oral, q6h  ketorolac, 15 mg, intravenous, q6h  polyethylene glycol, 17 g, oral, Daily  torsemide, 20 mg, oral, Daily      Continuous medications     PRN medications  PRN medications: naloxone, ondansetron, oxyCODONE, oxyCODONE, sennosides-docusate sodium, simethicone       Labs:  Results for orders placed or performed during the hospital encounter of 10/15/24 (from the past 24 hours)   CBC   Result Value Ref Range    WBC 5.7 4.4 - 11.3 x10*3/uL    nRBC 0.0 0.0 - 0.0 /100 WBCs    RBC 3.12 (L) 4.00 - 5.20 x10*6/uL    Hemoglobin 9.7 (L) 12.0 - 16.0 g/dL    Hematocrit 31.0 (L) 36.0 - 46.0 %    MCV 99 80 - 100 fL    MCH 31.1 26.0 - 34.0 pg    MCHC 31.3 (L) 32.0 - 36.0 g/dL    RDW 14.3 11.5 - 14.5 %    Platelets 233 150 - 450 x10*3/uL   Basic Metabolic Panel   Result Value Ref Range    Glucose 198 (H) 74 - 99 mg/dL    Sodium 138 136 - 145 mmol/L    Potassium 4.2 3.5 - 5.3 mmol/L    Chloride 106 98 - 107 mmol/L    Bicarbonate 25 21 - 32 mmol/L    Anion Gap 11 10 - 20 mmol/L    Urea Nitrogen 24 (H) 6 - 23 mg/dL    Creatinine 1.45 (H) 0.50 - 1.05 mg/dL    eGFR 37 (L) >60 mL/min/1.73m*2    Calcium 8.2 (L) 8.6 - 10.3 mg/dL        Imaging:  ECG 12 lead    Result Date: 10/7/2024  Normal sinus rhythm Left bundle branch block Possible lead V2 misplacement Abnormal ECG No previous ECGs available Confirmed by Jovanny Ortega ([id number]) on 10/7/2024 8:39:49 AM           Plan  Vaginal vault prolapse after hysterectomy     POD#1: s/p   Sacrospinous ligament fixation  Anterior repair  Midurethral sling  Cystoscopy    - surgery as above  - avss  - Diet: regular   - Pain control  - Nausea: antiemetics PRN  - Encourage OOB and IS  - Heparin for DVT prophylaxis  - Remove thomas catheter. Patient has not voided yet since thomas catheter removal. May need thomas replaced and follow up in 2 days for TOV.   -  DC IVF  - Bowel regimen     #HLD  - Home lipitor resumed     #HTN  - Home carvedilol resumed    #CHF  - Home demadex resumed      Plan of care discussed with Dr. Rivers and patient may discharge home. Home medications and prescriptions completed by urology fellow.  Follow up as scheduled.      LOAN Hargrove-CNP    I spent 35 minutes in the professional and overall care of this patient.     11:50 - Spoke with nurse, patient was able to void 300cc of urine with PVR 4cc this morning after thomas catheter removal.  Updated Dr. Rivers and patient is able to be discharged home.

## 2024-10-16 NOTE — PROGRESS NOTES
Spiritual Care Visit    Clinical Encounter Type  Visited With: Patient and family together  Routine Visit: Introduction     Patient with her son - was raised at [facility] in [place]         Sacramental Encounters  Sacrament of Sick-Anointing: Anointed                             Taxonomy  Intended Effects: Build relationship of care and support, Establish rapport and connectedness, Preserve dignity and respect, Lessen someone's feelings of isolation  Methods: Assist with spiritual/Mandaeism practices, Offer spiritual/Mandaeism support  Interventions: Active listening, Ask guided questions, Ask guided questions about cultural and Mandaeism values, Perform a Mandaeism rite or ritual, Share words of hope and inspiration

## 2024-10-16 NOTE — DISCHARGE SUMMARY
Discharge Diagnosis  Vaginal vault prolapse after hysterectomy    Issues Requiring Follow-Up  none    Test Results Pending At Discharge  Pending Labs       No current pending labs.            Hospital Course  Patient presented for scheduled surgery. She was admitted for observation and discharged on POD #1 without any complications.    Sacrospinous ligament fixation  Anterior repair  Midurethral sling  Cystoscopy    Pertinent Physical Exam At Time of Discharge  See progress note on day of discharge    Home Medications     Medication List      START taking these medications     ibuprofen 600 mg tablet; Take 1 tablet (600 mg) by mouth every 6 hours.   oxyCODONE 5 mg immediate release tablet; Commonly known as: Roxicodone;   Take 1 tablet (5 mg) by mouth every 6 hours if needed for severe pain (7 -   10) for up to 3 days.   polyethylene glycol 17 gram/dose powder; Commonly known as: Glycolax,   Miralax; Mix 17 g of powder and drink once daily.     CHANGE how you take these medications     * acetaminophen 650 mg ER tablet; Commonly known as: Tylenol 8 HOUR;   What changed: Another medication with the same name was added. Make sure   you understand how and when to take each.   * acetaminophen 500 mg tablet; Commonly known as: Tylenol; Take 2   tablets (1,000 mg) by mouth every 6 hours.; What changed: You were already   taking a medication with the same name, and this prescription was added.   Make sure you understand how and when to take each.  * This list has 2 medication(s) that are the same as other medications   prescribed for you. Read the directions carefully, and ask your doctor or   other care provider to review them with you.     CONTINUE taking these medications     allopurinol 300 mg tablet; Commonly known as: Zyloprim   atorvastatin 40 mg tablet; Commonly known as: Lipitor   buPROPion  mg 24 hr tablet; Commonly known as: Wellbutrin XL   calcitriol 0.25 mcg capsule; Commonly known as: Rocaltrol    carvedilol 12.5 mg tablet; Commonly known as: Coreg   losartan 25 mg tablet; Commonly known as: Cozaar   Prolia 60 mg/mL syringe; Generic drug: denosumab   spironolactone 25 mg tablet; Commonly known as: Aldactone   Stiolto Respimat 2.5-2.5 mcg/actuation mist inhaler; Generic drug:   tiotropium-olodateroL   torsemide 20 mg tablet; Commonly known as: Demadex     STOP taking these medications     chlorhexidine 0.12 % solution; Commonly known as: Peridex   fluconazole 100 mg tablet; Commonly known as: Diflucan     ASK your doctor about these medications     estradiol 0.01 % (0.1 mg/gram) vaginal cream; Commonly known as:   Estrace; Apply nightly for 3 weeks, then 3 times per week.       Outpatient Follow-Up  Future Appointments   Date Time Provider Department Center   10/29/2024  2:40 PM LOAN Munroe-CNP [id number] MD Sancho  Urogynecology and Reconstructive Pelvic Surgery Fellow  10/16/2024 6:35 PM

## 2024-10-16 NOTE — CARE PLAN
Problem: Pain - Adult  Goal: Verbalizes/displays adequate comfort level or baseline comfort level  Outcome: Progressing     Problem: Safety - Adult  Goal: Free from fall injury  Outcome: Progressing     Problem: Discharge Planning  Goal: Discharge to home or other facility with appropriate resources  Outcome: Progressing     Problem: Chronic Conditions and Co-morbidities  Goal: Patient's chronic conditions and co-morbidity symptoms are monitored and maintained or improved  Outcome: Progressing     Problem: Pain  Goal: Takes deep breaths with improved pain control throughout the shift  Outcome: Progressing  Goal: Turns in bed with improved pain control throughout the shift  Outcome: Progressing  Goal: Walks with improved pain control throughout the shift  Outcome: Progressing  Goal: Performs ADL's with improved pain control throughout shift  Outcome: Progressing  Goal: Participates in PT with improved pain control throughout the shift  Outcome: Progressing  Goal: Free from opioid side effects throughout the shift  Outcome: Progressing  Goal: Free from acute confusion related to pain meds throughout the shift  Outcome: Progressing     Problem: Skin  Goal: Decreased wound size/increased tissue granulation at next dressing change  Outcome: Progressing  Goal: Participates in plan/prevention/treatment measures  Outcome: Progressing  Goal: Prevent/manage excess moisture  Outcome: Progressing  Goal: Prevent/minimize sheer/friction injuries  Outcome: Progressing  Goal: Promote/optimize nutrition  Outcome: Progressing  Goal: Promote skin healing  Outcome: Progressing     Problem: Fall/Injury  Goal: Not fall by end of shift  Outcome: Progressing  Goal: Be free from injury by end of the shift  Outcome: Progressing  Goal: Verbalize understanding of personal risk factors for fall in the hospital  Outcome: Progressing  Goal: Verbalize understanding of risk factor reduction measures to prevent injury from fall in the  home  Outcome: Progressing  Goal: Use assistive devices by end of the shift  Outcome: Progressing  Goal: Pace activities to prevent fatigue by end of the shift  Outcome: Progressing     Problem: Meds/Post-op Pain  Goal: Pain controlled to tolerate pain level  Outcome: Progressing  Goal: Tolerates prescribed medication  Outcome: Progressing     Problem: DVT/VTE Prevention/Activity  Goal: No decrease in circulation/sensation  Outcome: Progressing  Goal: Prevent skin breakdown  Outcome: Progressing  Goal: Return to preop oxygenation status  Outcome: Progressing  Goal: Tolerates optimal activity  Outcome: Progressing  Goal: Increase self care and/or family involvement in 24 hrs.  Outcome: Progressing     Problem: Wound care/infection prevention  Goal: No signs of infection in 24 hrs.  Outcome: Progressing  Goal: No unexpected bleeding from incision this shift  Outcome: Progressing     Problem: Diet/fluid balance  Goal: Adequate urinary output  Outcome: Progressing  Goal: Free from nausea/vomiting  Outcome: Progressing  Goal: Return in bowel function  Outcome: Progressing  Goal: Tolerates prescribed diet  Outcome: Progressing     Problem: Other goals  Goal: No change in neurological status  Outcome: Progressing  Goal: Stabilize vital signs (return to 10% of baseline)  Outcome: Progressing   The patient's goals for the shift include      The clinical goals for the shift include Pt will have minimal pain this shift and recieve adequate rest.    Over the shift, the patient did have minimal pain and received adequate rest.

## 2024-10-29 ENCOUNTER — APPOINTMENT (OUTPATIENT)
Dept: UROLOGY | Facility: CLINIC | Age: 79
End: 2024-10-29
Payer: MEDICARE

## 2024-10-29 VITALS — HEART RATE: 89 BPM | DIASTOLIC BLOOD PRESSURE: 75 MMHG | SYSTOLIC BLOOD PRESSURE: 121 MMHG | TEMPERATURE: 97.8 F

## 2024-10-29 DIAGNOSIS — R30.0 DYSURIA: ICD-10-CM

## 2024-10-29 DIAGNOSIS — N81.10 FEMALE BLADDER PROLAPSE: Primary | ICD-10-CM

## 2024-10-29 LAB
POC APPEARANCE, URINE: CLEAR
POC BILIRUBIN, URINE: NEGATIVE
POC BLOOD, URINE: NEGATIVE
POC COLOR, URINE: YELLOW
POC GLUCOSE, URINE: NEGATIVE MG/DL
POC KETONES, URINE: NEGATIVE MG/DL
POC LEUKOCYTES, URINE: ABNORMAL
POC NITRITE,URINE: NEGATIVE
POC PH, URINE: 6 PH
POC PROTEIN, URINE: ABNORMAL MG/DL
POC SPECIFIC GRAVITY, URINE: 1.02
POC UROBILINOGEN, URINE: 0.2 EU/DL

## 2024-10-29 PROCEDURE — 99024 POSTOP FOLLOW-UP VISIT: CPT | Performed by: NURSE PRACTITIONER

## 2024-10-29 PROCEDURE — 1159F MED LIST DOCD IN RCRD: CPT | Performed by: NURSE PRACTITIONER

## 2024-10-29 PROCEDURE — 81003 URINALYSIS AUTO W/O SCOPE: CPT | Performed by: NURSE PRACTITIONER

## 2025-01-20 ENCOUNTER — TELEPHONE (OUTPATIENT)
Dept: UROLOGY | Facility: CLINIC | Age: 80
End: 2025-01-20
Payer: MEDICARE

## 2025-01-20 DIAGNOSIS — R30.0 DYSURIA: ICD-10-CM

## 2025-01-20 NOTE — TELEPHONE ENCOUNTER
Patient states she is having vaginal burning, itching and thick discharge. Urinalysis ordered to further assess patient's symptoms.

## 2025-01-23 ENCOUNTER — LAB (OUTPATIENT)
Dept: LAB | Facility: LAB | Age: 80
End: 2025-01-23
Payer: MEDICARE

## 2025-01-23 DIAGNOSIS — R30.0 DYSURIA: ICD-10-CM

## 2025-01-23 LAB
APPEARANCE UR: ABNORMAL
BACTERIA #/AREA URNS AUTO: ABNORMAL /HPF
BILIRUB UR STRIP.AUTO-MCNC: NEGATIVE MG/DL
COLOR UR: ABNORMAL
GLUCOSE UR STRIP.AUTO-MCNC: NORMAL MG/DL
HYALINE CASTS #/AREA URNS AUTO: ABNORMAL /LPF
KETONES UR STRIP.AUTO-MCNC: NEGATIVE MG/DL
LEUKOCYTE ESTERASE UR QL STRIP.AUTO: ABNORMAL
MUCOUS THREADS #/AREA URNS AUTO: ABNORMAL /LPF
NITRITE UR QL STRIP.AUTO: ABNORMAL
PH UR STRIP.AUTO: 6 [PH]
PROT UR STRIP.AUTO-MCNC: ABNORMAL MG/DL
RBC # UR STRIP.AUTO: ABNORMAL /UL
RBC #/AREA URNS AUTO: >20 /HPF
SP GR UR STRIP.AUTO: 1.02
SQUAMOUS #/AREA URNS AUTO: ABNORMAL /HPF
UROBILINOGEN UR STRIP.AUTO-MCNC: NORMAL MG/DL
WBC #/AREA URNS AUTO: >50 /HPF
WBC CLUMPS #/AREA URNS AUTO: ABNORMAL /HPF

## 2025-01-23 PROCEDURE — 81001 URINALYSIS AUTO W/SCOPE: CPT

## 2025-01-23 PROCEDURE — 87186 SC STD MICRODIL/AGAR DIL: CPT

## 2025-01-23 PROCEDURE — 87086 URINE CULTURE/COLONY COUNT: CPT

## 2025-01-24 LAB — HOLD SPECIMEN: NORMAL

## 2025-01-26 LAB — BACTERIA UR CULT: ABNORMAL

## 2025-01-28 ENCOUNTER — TELEPHONE (OUTPATIENT)
Dept: UROLOGY | Facility: CLINIC | Age: 80
End: 2025-01-28
Payer: MEDICARE

## 2025-01-28 DIAGNOSIS — N39.0 RECURRENT UTI: ICD-10-CM

## 2025-01-28 RX ORDER — NITROFURANTOIN 25; 75 MG/1; MG/1
100 CAPSULE ORAL 2 TIMES DAILY
Qty: 10 CAPSULE | Refills: 0 | Status: SHIPPED | OUTPATIENT
Start: 2025-01-28 | End: 2025-02-02

## 2025-01-30 ENCOUNTER — APPOINTMENT (OUTPATIENT)
Dept: UROLOGY | Facility: CLINIC | Age: 80
End: 2025-01-30
Payer: MEDICARE

## 2025-01-30 VITALS
HEART RATE: 75 BPM | DIASTOLIC BLOOD PRESSURE: 84 MMHG | BODY MASS INDEX: 32.36 KG/M2 | HEIGHT: 61 IN | WEIGHT: 171.4 LBS | SYSTOLIC BLOOD PRESSURE: 137 MMHG

## 2025-01-30 DIAGNOSIS — R39.89 OTHER SYMPTOMS AND SIGNS INVOLVING THE GENITOURINARY SYSTEM: ICD-10-CM

## 2025-01-30 DIAGNOSIS — N81.10 FEMALE BLADDER PROLAPSE: ICD-10-CM

## 2025-01-30 LAB
POC APPEARANCE, URINE: ABNORMAL
POC BILIRUBIN, URINE: NEGATIVE
POC BLOOD, URINE: NEGATIVE
POC COLOR, URINE: ABNORMAL
POC GLUCOSE, URINE: NEGATIVE MG/DL
POC KETONES, URINE: NEGATIVE MG/DL
POC LEUKOCYTES, URINE: ABNORMAL
POC NITRITE,URINE: NEGATIVE
POC PH, URINE: 5.5 PH
POC PROTEIN, URINE: ABNORMAL MG/DL
POC SPECIFIC GRAVITY, URINE: 1.02
POC UROBILINOGEN, URINE: 0.2 EU/DL

## 2025-01-30 PROCEDURE — 81003 URINALYSIS AUTO W/O SCOPE: CPT | Performed by: UROLOGY

## 2025-01-30 PROCEDURE — 1159F MED LIST DOCD IN RCRD: CPT | Performed by: UROLOGY

## 2025-01-30 PROCEDURE — 99213 OFFICE O/P EST LOW 20 MIN: CPT | Performed by: UROLOGY

## 2025-01-30 NOTE — PROGRESS NOTES
"HISTORY OF PRESENT ILLNESS:  This is a 79 y.o. female who presents for follow-up for Carlita. She was last seen by our office on 9/19/24 for pelvic organ prolapse, stool coming from the vagina, and rUTIs. She is s/p sacrospinous ligament suspension, anterior repair, mid urethral sling and cystoscopy 10/15/2024.     Last seen by Verena Sylvester [10/29/2024]. According to her note, patient was extremely satisfied with the procedure.       Pelvic organ prolapse s/p sacrospinous ligament fixation, anterior repair, and midurethral sling with cysto on 10/15 with Dr. 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 Healed as per EUA with Dr. Wright.  Recurrent UTIs      Today:    Prolapse:  - Patient does continue to feel a bulge but denies seeing it.  -She does endorse some tightness and difficulty voiding. Her PVR today is 0  - Hysterectomy in 1991 but she has since had a pelvic organ prolapse  - No symptoms of urinary obstruction  - No incontinence  - Urinary frequency    Recent UTI:  - 2 weeks ago she felt dysuria and hadvaginal discharge. UA was positive and UC reveald 100k E coli  - She began a 10 day course of macrobid before yesterday.  - Symptoms have markedly improved since then        PHYSICAL EXAMINATION:  No LMP recorded.  There is no height or weight on file to calculate BMI.  Visit Vitals  Smoking Status Former     Visit Vitals  /84   Pulse 75   Ht 1.549 m (5' 1\")   Wt 77.7 kg (171 lb 6.4 oz)   BMI 32.39 kg/m²   Smoking Status Former   BSA 1.83 m²         General Appearance: well appearing  Neuro: Alert and oriented     Pelvic:  Genitourinary: normal external genitalia, Bartholin's glands negative, [place]'s glands negative  Urethra: normal meatus, non-tender, no periurethral mass  Vaginal mucosa: normal  Aa 0 ba 0 c -5  Gh 3 pb 4 tvl 6  Ap-3 bp-3      Rectal: visibly concentric squeeze, no remarkable notes  PVR: 0 mL    IMPRESSION AND PLAN:  Vilma Sagastume is a 79 y.o.  with rUTIs, h/o " pelvic organ prolapse failed pessary because of complicating rectovaginal fistula. Fistula is healed per the EUA with Dr. Wright. She is s/p sacrospinous ligament suspension, anterior repair, mid urethral sling and cystoscopy 10/15/2024    There is evidence of small control anterior wall prolapse.  Patient is asymptomatic and we will observe with serial back in office in 6 months.  She will continue on antibiotics for the recent UTI.  She is emptying her bladder completely as evidenced by her normal PVR.      Follow up in     I personally verified?and edited?the documentation of the medical student including the key elements of the history and confirm its accuracy. I personally performed the physical examination and medical decision making as documented in the medical student's note and confirm its accuracy. The medical decision making for this service was based on my clinical judgement.     Bethel Rivesr MD   [phone]

## 2025-03-25 ENCOUNTER — HOSPITAL ENCOUNTER (OUTPATIENT)
Dept: INFUSION THERAPY | Age: 80
Setting detail: INFUSION SERIES
Discharge: HOME | End: 2025-03-25
Payer: MEDICARE

## 2025-03-25 VITALS
RESPIRATION RATE: 18 BRPM | DIASTOLIC BLOOD PRESSURE: 87 MMHG | HEIGHT: 61 IN | BODY MASS INDEX: 32.1 KG/M2 | TEMPERATURE: 97.3 F | HEART RATE: 75 BPM | OXYGEN SATURATION: 97 % | SYSTOLIC BLOOD PRESSURE: 129 MMHG | WEIGHT: 170 LBS

## 2025-03-25 DIAGNOSIS — M81.0 SENILE OSTEOPOROSIS: Primary | ICD-10-CM

## 2025-03-25 PROCEDURE — 96372 THER/PROPH/DIAG INJ SC/IM: CPT

## 2025-03-25 PROCEDURE — 6360000002 HC RX W HCPCS: Performed by: NURSE PRACTITIONER

## 2025-03-25 RX ADMIN — DENOSUMAB 60 MG: 60 INJECTION SUBCUTANEOUS at 11:59

## 2025-07-31 ENCOUNTER — APPOINTMENT (OUTPATIENT)
Dept: UROLOGY | Facility: CLINIC | Age: 80
End: 2025-07-31
Payer: MEDICARE

## 2025-09-11 ENCOUNTER — APPOINTMENT (OUTPATIENT)
Dept: UROLOGY | Facility: CLINIC | Age: 80
End: 2025-09-11
Payer: MEDICARE

## 2025-10-23 ENCOUNTER — APPOINTMENT (OUTPATIENT)
Dept: UROLOGY | Facility: CLINIC | Age: 80
End: 2025-10-23
Payer: MEDICARE

## (undated) DEVICE — SYRINGE, LUER LOCK, 12ML

## (undated) DEVICE — GOWN, ASTOUND, XL

## (undated) DEVICE — TOWEL PACK, STERILE, 4/PACK, BLUE

## (undated) DEVICE — PACKING, VAGINAL, XRAY DETECTABLE, 2IN X 3YD, STERILE

## (undated) DEVICE — Device

## (undated) DEVICE — BRIEF, PANTY, MESH, XL, 2PK

## (undated) DEVICE — SUTURE, VICRYL, 0, 27 IN, CT-2, UNDYED

## (undated) DEVICE — IRRIGATION SET, CYSTOSCOPY, REGULATING CLAMP, STRAIGHT, 81 IN

## (undated) DEVICE — SUTURE, VICRYL, 4-0, 18 IN, UNDYED BR PS-2

## (undated) DEVICE — DEVICE, SUTURE, CAPIO SLIM

## (undated) DEVICE — ADHESIVE, SKIN, DERMABOND ADVANCED, 15CM, PEN-STYLE

## (undated) DEVICE — RETRACTOR, SURGICAL, RING, PLASTIC, DISPOSABLE

## (undated) DEVICE — SYRINGE, 10 CC, LUER LOCK

## (undated) DEVICE — SUTURE, MONODEK, ABSORBABLE, DBL-ARMED, 1/2 CIRCLE, TAPER

## (undated) DEVICE — GLOVE, SURGICAL, PROTEXIS PI , 7.5, PF, LF

## (undated) DEVICE — TIP, SUCTION, YANKAUER, FLEXIBLE

## (undated) DEVICE — SUTURE, VICRYL, 5-0, 27IN, RB-1

## (undated) DEVICE — DRESSING, ABDOMINAL, WET PRUF, TENDERSORB, 5 X 9 IN, STERILE

## (undated) DEVICE — NEEDLE, HYPODERMIC, SPECIALTY, REGULAR WALL, SHORT BEVEL, 18 G X 1.5 IN

## (undated) DEVICE — SUTURE, SOFSILK, 0, 24 IN, MULTIPACK, BLACK

## (undated) DEVICE — GLOVE, SURGICAL, PROTEXIS PI BLUE W/NEUTHERA, 8.0, PF, LF

## (undated) DEVICE — SUTURE, CTD, VICRYL, 2-0, UND, BR, CT-2

## (undated) DEVICE — SOLUTION, IRRIGATION, SODIUM CHLORIDE 0.9%, 1000 ML, POUR BOTTLE

## (undated) DEVICE — SLING SYSTEM, MID URETHERAL, LYNX SUPRAPUBIC

## (undated) DEVICE — SHEATH, CYSTO URETH, W/OBTURATOR, 20 FR

## (undated) DEVICE — MARKER, SKIN, DUAL TIP, W/RULER AND LABEL

## (undated) DEVICE — COUNTER, NEEDLE, FOAM BLOCK, POP-N-COUNT, W/BLADEGUARD, W/ADHESIVE 40 COUNT, RED

## (undated) DEVICE — COVER HANDLE LIGHT, STERIS, BLUE, STERILE

## (undated) DEVICE — GLOVE, SURGICAL, PROTEXIS PI ORTHO, 7.0, PF, LF

## (undated) DEVICE — SPONGE, LAP, XRAY DECT, 18IN X 18IN, W/MASTER DMT, STERILE

## (undated) DEVICE — SOLUTION, IRRIGATION, STERILE WATER, 1000 ML, POUR BOTTLE

## (undated) DEVICE — PREP TRAY, VAGINAL

## (undated) DEVICE — BRIDGE, CYSTO DBL CHANNEL

## (undated) DEVICE — TRAY, SURESTEP, SILICONE DRAINAGE BAG, STATLOCK, 16FR

## (undated) DEVICE — CATHETER, IV, ANGIOCATH, 18 G X 1.88 IN, FEP POLYMER

## (undated) DEVICE — STAY SET, SURGICAL , 5MM SHARP HOOK, CS/ 50

## (undated) DEVICE — APPLICATOR, CHLORAPREP, W/ORANGE TINT, 26ML

## (undated) DEVICE — CONTAINER, SPECIMEN, 120 ML, STERILE

## (undated) DEVICE — IRRIGATION SET, CYSTOSCOPY, TURP, Y, CONTINUOUS, 81 IN

## (undated) DEVICE — SUTURE, SILK, 0 PERMA HAND, BR, SUTUPAK, 30IN, BLACK

## (undated) DEVICE — SUTURE, VICRYL, 3-0, 27 IN, SH

## (undated) DEVICE — GLOVE, SURGICAL, PROTEXIS PI ORTHO, 7.5, PF, LF

## (undated) DEVICE — GLOVE, SURGICAL, PROTEXIS PI W/NEU-THERA, 7.5, PF, LF

## (undated) DEVICE — TUBING, SUCTION, CONNECTING, STERILE 0.25 X 120 IN., LF

## (undated) DEVICE — TUBING, SUCTION, CONNECTING, 9/32 X 10FT, LF

## (undated) DEVICE — DRAPE PACK, LAVH, W/ATTACHED LEGGINGS, W/POUCH, 100 X 114 IN, LF, STERILE